# Patient Record
Sex: MALE | Race: WHITE | NOT HISPANIC OR LATINO | Employment: OTHER | URBAN - NONMETROPOLITAN AREA
[De-identification: names, ages, dates, MRNs, and addresses within clinical notes are randomized per-mention and may not be internally consistent; named-entity substitution may affect disease eponyms.]

---

## 2017-01-24 DIAGNOSIS — N40.1 BPH (BENIGN PROSTATIC HYPERTROPHY) WITH URINARY OBSTRUCTION: ICD-10-CM

## 2017-01-24 DIAGNOSIS — N13.8 BPH (BENIGN PROSTATIC HYPERTROPHY) WITH URINARY OBSTRUCTION: ICD-10-CM

## 2017-01-24 RX ORDER — TAMSULOSIN HYDROCHLORIDE 0.4 MG/1
CAPSULE ORAL
Qty: 30 CAPSULE | Refills: 3 | Status: SHIPPED | OUTPATIENT
Start: 2017-01-24 | End: 2017-01-26 | Stop reason: SDUPTHER

## 2017-01-26 DIAGNOSIS — N40.1 BPH (BENIGN PROSTATIC HYPERTROPHY) WITH URINARY OBSTRUCTION: ICD-10-CM

## 2017-01-26 DIAGNOSIS — N13.8 BPH (BENIGN PROSTATIC HYPERTROPHY) WITH URINARY OBSTRUCTION: ICD-10-CM

## 2017-01-26 RX ORDER — CEPHALEXIN 250 MG/1
250 CAPSULE ORAL DAILY
Qty: 30 CAPSULE | Refills: 11 | Status: SHIPPED | OUTPATIENT
Start: 2017-01-26 | End: 2017-05-30 | Stop reason: HOSPADM

## 2017-01-26 RX ORDER — TAMSULOSIN HYDROCHLORIDE 0.4 MG/1
1 CAPSULE ORAL DAILY
Qty: 30 CAPSULE | Refills: 11 | Status: SHIPPED | OUTPATIENT
Start: 2017-01-26 | End: 2017-05-30 | Stop reason: SDUPTHER

## 2017-05-30 ENCOUNTER — OFFICE VISIT (OUTPATIENT)
Dept: UROLOGY | Facility: CLINIC | Age: 82
End: 2017-05-30

## 2017-05-30 VITALS
HEART RATE: 79 BPM | DIASTOLIC BLOOD PRESSURE: 75 MMHG | OXYGEN SATURATION: 96 % | TEMPERATURE: 98.7 F | SYSTOLIC BLOOD PRESSURE: 128 MMHG | RESPIRATION RATE: 18 BRPM

## 2017-05-30 DIAGNOSIS — N40.0 BENIGN NON-NODULAR PROSTATIC HYPERPLASIA, PRESENCE OF LOWER URINARY TRACT SYMPTOMS UNSPECIFIED: ICD-10-CM

## 2017-05-30 DIAGNOSIS — N40.1 BPH (BENIGN PROSTATIC HYPERTROPHY) WITH URINARY OBSTRUCTION: ICD-10-CM

## 2017-05-30 DIAGNOSIS — N52.01 ERECTILE DYSFUNCTION DUE TO ARTERIAL INSUFFICIENCY: ICD-10-CM

## 2017-05-30 DIAGNOSIS — Z87.440 HISTORY OF UTI: ICD-10-CM

## 2017-05-30 DIAGNOSIS — Z87.898 HISTORY OF URINARY RETENTION: Primary | ICD-10-CM

## 2017-05-30 DIAGNOSIS — N13.8 BPH (BENIGN PROSTATIC HYPERTROPHY) WITH URINARY OBSTRUCTION: ICD-10-CM

## 2017-05-30 LAB
BILIRUB BLD-MCNC: NEGATIVE MG/DL
CLARITY, POC: CLEAR
COLOR UR: YELLOW
GLUCOSE UR STRIP-MCNC: NEGATIVE MG/DL
KETONES UR QL: NEGATIVE
LEUKOCYTE EST, POC: NEGATIVE
NITRITE UR-MCNC: NEGATIVE MG/ML
PH UR: 6 [PH] (ref 5–8)
PROT UR STRIP-MCNC: ABNORMAL MG/DL
RBC # UR STRIP: NEGATIVE /UL
SP GR UR: 1.02 (ref 1–1.03)
UROBILINOGEN UR QL: NORMAL

## 2017-05-30 PROCEDURE — 81003 URINALYSIS AUTO W/O SCOPE: CPT | Performed by: UROLOGY

## 2017-05-30 PROCEDURE — 99213 OFFICE O/P EST LOW 20 MIN: CPT | Performed by: UROLOGY

## 2017-05-30 RX ORDER — SILDENAFIL CITRATE 20 MG/1
20 TABLET ORAL SEE ADMIN INSTRUCTIONS
Qty: 10 TABLET | Refills: 11 | Status: SHIPPED | OUTPATIENT
Start: 2017-05-30 | End: 2018-01-04

## 2017-05-30 RX ORDER — TAMSULOSIN HYDROCHLORIDE 0.4 MG/1
1 CAPSULE ORAL DAILY
Qty: 90 CAPSULE | Refills: 3 | Status: SHIPPED | OUTPATIENT
Start: 2017-05-30 | End: 2018-01-04 | Stop reason: SDUPTHER

## 2017-05-31 LAB
PSA FREE MFR SERPL: 35.7 %
PSA FREE SERPL-MCNC: 0.5 NG/ML
PSA SERPL-MCNC: 1.4 NG/ML (ref 0–4)

## 2017-06-29 ENCOUNTER — OFFICE VISIT (OUTPATIENT)
Dept: UROLOGY | Facility: CLINIC | Age: 82
End: 2017-06-29

## 2017-06-29 VITALS
WEIGHT: 177.4 LBS | HEART RATE: 97 BPM | HEIGHT: 72 IN | TEMPERATURE: 99.1 F | OXYGEN SATURATION: 99 % | DIASTOLIC BLOOD PRESSURE: 78 MMHG | SYSTOLIC BLOOD PRESSURE: 145 MMHG | BODY MASS INDEX: 24.03 KG/M2

## 2017-06-29 DIAGNOSIS — N13.8 BPH (BENIGN PROSTATIC HYPERTROPHY) WITH URINARY OBSTRUCTION: ICD-10-CM

## 2017-06-29 DIAGNOSIS — Z87.898 HISTORY OF URINARY RETENTION: Primary | ICD-10-CM

## 2017-06-29 DIAGNOSIS — N40.1 BPH (BENIGN PROSTATIC HYPERTROPHY) WITH URINARY OBSTRUCTION: ICD-10-CM

## 2017-06-29 LAB
BILIRUB BLD-MCNC: NEGATIVE MG/DL
CLARITY, POC: CLEAR
COLOR UR: YELLOW
GLUCOSE UR STRIP-MCNC: NEGATIVE MG/DL
KETONES UR QL: NEGATIVE
LEUKOCYTE EST, POC: NEGATIVE
NITRITE UR-MCNC: NEGATIVE MG/ML
PH UR: 6 [PH] (ref 5–8)
PROT UR STRIP-MCNC: NEGATIVE MG/DL
RBC # UR STRIP: NEGATIVE /UL
SP GR UR: 1.02 (ref 1–1.03)
UROBILINOGEN UR QL: NORMAL

## 2017-06-29 PROCEDURE — 81003 URINALYSIS AUTO W/O SCOPE: CPT | Performed by: UROLOGY

## 2017-06-29 PROCEDURE — 99213 OFFICE O/P EST LOW 20 MIN: CPT | Performed by: UROLOGY

## 2017-06-29 RX ORDER — CEPHALEXIN 250 MG/1
CAPSULE ORAL
COMMUNITY
Start: 2017-06-26 | End: 2020-05-20

## 2017-06-29 NOTE — PROGRESS NOTES
Chief Complaint  Follow-up (1 month fup)        DESHAUN Baugh is a 81 y.o. male who returns today for follow-up with a past history of urinary retention that produced hydronephrosis and renal failure.  After TURP he's been voiding better and the creatinine has been stable.  He had some trouble with infections last year and was maintained on chronic suppression with Keflex.  He is currently having no difficulty voiding at least while taking Flomax.    Vitals:    06/29/17 1320   BP: 145/78   Pulse: 97   Temp: 99.1 °F (37.3 °C)   SpO2: 99%       Past Medical History  Past Medical History:   Diagnosis Date   • Elevated PSA    • Hydronephrosis    • Kidney failure    • Urinary retention        Past Surgical History  Past Surgical History:   Procedure Laterality Date   • CYSTOSCOPY TRANSURETHRAL RESECTION OF PROSTATE         Medications  has a current medication list which includes the following prescription(s): amlodipine, cephalexin, cetirizine, famotidine, fluticasone, levothyroxine, pravastatin, sildenafil, sildenafil, sodium bicarbonate, tamsulosin, and telmisartan.      Allergies  No Known Allergies    Social History  Social History     Social History Narrative       Family History  He has no family history of bladder or kidney cancer  He has no family history of kidney stones      AUA Symptom Score:      Review of Systems  Review of Systems    Physical Exam  Physical Exam   Constitutional: He is oriented to person, place, and time. He appears well-developed and well-nourished.   HENT:   Head: Normocephalic and atraumatic.   Neck: Normal range of motion.   Pulmonary/Chest: Effort normal. No respiratory distress.   Abdominal: Soft. He exhibits no distension and no mass. There is no tenderness. No hernia.   Genitourinary: Rectum normal and prostate normal.   Musculoskeletal: Normal range of motion.   Lymphadenopathy:     He has no cervical adenopathy.   Neurological: He is alert and oriented to person, place, and time.    Skin: Skin is warm and dry.   Psychiatric: He has a normal mood and affect. His behavior is normal.   Vitals reviewed.      Labs Recent and today in the office:  Results for orders placed or performed in visit on 06/29/17   POC Urinalysis Dipstick, Automated   Result Value Ref Range    Color Yellow Yellow, Straw, Dark Yellow, Muriel    Clarity, UA Clear Clear    Glucose, UA Negative Negative, 1000 mg/dL (3+) mg/dL    Bilirubin Negative Negative    Ketones, UA Negative Negative    Specific Gravity  1.020 1.005 - 1.030    Blood, UA Negative Negative    pH, Urine 6.0 5.0 - 8.0    Protein, POC Negative Negative mg/dL    Urobilinogen, UA Normal Normal    Leukocytes Negative Negative    Nitrite, UA Negative Negative         Assessment & Plan  Digital rectal exam is benign and total to free PSA ratio indicating little risk of cancer.  He'll therefore continue the Flomax and return in 6 months or when necessary.  He was to stop taking the daily Keflex.

## 2018-01-04 ENCOUNTER — OFFICE VISIT (OUTPATIENT)
Dept: UROLOGY | Facility: CLINIC | Age: 83
End: 2018-01-04

## 2018-01-04 VITALS
OXYGEN SATURATION: 98 % | HEART RATE: 85 BPM | SYSTOLIC BLOOD PRESSURE: 128 MMHG | RESPIRATION RATE: 16 BRPM | TEMPERATURE: 98.3 F | BODY MASS INDEX: 24.41 KG/M2 | WEIGHT: 180 LBS | DIASTOLIC BLOOD PRESSURE: 76 MMHG

## 2018-01-04 DIAGNOSIS — Z87.898 HISTORY OF URINARY RETENTION: Primary | ICD-10-CM

## 2018-01-04 DIAGNOSIS — N40.1 BPH WITH URINARY OBSTRUCTION: ICD-10-CM

## 2018-01-04 DIAGNOSIS — N13.8 BPH WITH URINARY OBSTRUCTION: ICD-10-CM

## 2018-01-04 DIAGNOSIS — N19 RENAL FAILURE, UNSPECIFIED CHRONICITY: ICD-10-CM

## 2018-01-04 PROCEDURE — 81003 URINALYSIS AUTO W/O SCOPE: CPT | Performed by: UROLOGY

## 2018-01-04 PROCEDURE — 99214 OFFICE O/P EST MOD 30 MIN: CPT | Performed by: UROLOGY

## 2018-01-04 RX ORDER — TAMSULOSIN HYDROCHLORIDE 0.4 MG/1
1 CAPSULE ORAL DAILY
Qty: 90 CAPSULE | Refills: 3 | Status: SHIPPED | OUTPATIENT
Start: 2018-01-04 | End: 2018-07-11 | Stop reason: SDUPTHER

## 2018-01-04 RX ORDER — FLUTICASONE PROPIONATE 50 MCG
2 SPRAY, SUSPENSION (ML) NASAL DAILY
Qty: 16 G | Refills: 5 | OUTPATIENT
Start: 2018-01-04 | End: 2020-05-20

## 2018-01-04 NOTE — PROGRESS NOTES
Chief Complaint  Urinary Retention (6 month fup.) and history of renal failure        DESHAUN Baugh is a 82 y.o. male who returns for follow-up of his known enlarged prostate and difficulty voiding and even urinary retention that prompted hydronephrosis and renal failure.  Fortunately after TURP he's been voiding much better at least while taking Flomax.  He's had problems with recurrent infections in the past but none recently and he is no longer taking his chronic suppression with Keflex.  He is still followed by Dr. Benjamin for his chronic renal insufficiency.  His digital rectal exam and PSA were normal on last visit indicating little risk of prostate cancer.  He is also asking for refill on his nasal steroid which I support since if he takes decongestants for his allergies it interferes with his voiding.    Vitals:    01/04/18 1312   BP: 128/76   Pulse: 85   Resp: 16   Temp: 98.3 °F (36.8 °C)   SpO2: 98%       Past Medical History  Past Medical History:   Diagnosis Date   • Elevated PSA    • Hydronephrosis    • Kidney failure    • Urinary retention        Past Surgical History  Past Surgical History:   Procedure Laterality Date   • CYSTOSCOPY TRANSURETHRAL RESECTION OF PROSTATE         Medications  has a current medication list which includes the following prescription(s): amlodipine, famotidine, fluticasone, levothyroxine, pravastatin, sildenafil, sildenafil, sodium bicarbonate, tamsulosin, telmisartan, cephalexin, and cetirizine.      Allergies  No Known Allergies    Social History  Social History     Social History Narrative       Family History  He has no family history of bladder or kidney cancer  He has no family history of kidney stones      AUA Symptom Score:      Review of Systems  Review of Systems   Constitutional: Negative.    Genitourinary: Negative.    All other systems reviewed and are negative.      Physical Exam  Physical Exam    Labs Recent and today in the office:  Results for orders placed or  performed in visit on 01/04/18   POC Urinalysis Dipstick, Automated   Result Value Ref Range    Color Yellow Yellow, Straw, Dark Yellow, Muriel    Clarity, UA Clear Clear    Glucose, UA Negative Negative, 1000 mg/dL (3+) mg/dL    Bilirubin Negative Negative    Ketones, UA Negative Negative    Specific Gravity  1.020 1.005 - 1.030    Blood, UA Negative Negative    pH, Urine 6.0 5.0 - 8.0    Protein, POC Trace (A) Negative mg/dL    Urobilinogen, UA Normal Normal    Leukocytes Negative Negative    Nitrite, UA Negative Negative         Assessment & Plan  BPH: He'll continue his daily Flomax and return in 6 months for BLANCA and PSA.  His medication is refilled today.  His voided urine is clear.

## 2018-07-11 ENCOUNTER — OFFICE VISIT (OUTPATIENT)
Dept: UROLOGY | Facility: CLINIC | Age: 83
End: 2018-07-11

## 2018-07-11 VITALS
DIASTOLIC BLOOD PRESSURE: 70 MMHG | BODY MASS INDEX: 24.41 KG/M2 | TEMPERATURE: 98.6 F | HEART RATE: 82 BPM | WEIGHT: 180 LBS | OXYGEN SATURATION: 98 % | SYSTOLIC BLOOD PRESSURE: 128 MMHG

## 2018-07-11 DIAGNOSIS — N28.9 KIDNEY DISEASE: Primary | ICD-10-CM

## 2018-07-11 DIAGNOSIS — N40.1 BPH WITH URINARY OBSTRUCTION: ICD-10-CM

## 2018-07-11 DIAGNOSIS — N13.8 BPH WITH URINARY OBSTRUCTION: ICD-10-CM

## 2018-07-11 DIAGNOSIS — Z87.898 HISTORY OF URINARY RETENTION: ICD-10-CM

## 2018-07-11 DIAGNOSIS — N52.01 ERECTILE DYSFUNCTION DUE TO ARTERIAL INSUFFICIENCY: ICD-10-CM

## 2018-07-11 LAB
BILIRUB BLD-MCNC: NEGATIVE MG/DL
CLARITY, POC: CLEAR
COLOR UR: YELLOW
GLUCOSE UR STRIP-MCNC: NEGATIVE MG/DL
KETONES UR QL: NEGATIVE
LEUKOCYTE EST, POC: NEGATIVE
NITRITE UR-MCNC: NEGATIVE MG/ML
PH UR: 6 [PH] (ref 5–8)
PROT UR STRIP-MCNC: NEGATIVE MG/DL
PSA SERPL-MCNC: 1.24 NG/ML (ref 0.06–4)
RBC # UR STRIP: NEGATIVE /UL
SP GR UR: 1.02 (ref 1–1.03)
UROBILINOGEN UR QL: NORMAL

## 2018-07-11 PROCEDURE — 99213 OFFICE O/P EST LOW 20 MIN: CPT | Performed by: UROLOGY

## 2018-07-11 PROCEDURE — 81003 URINALYSIS AUTO W/O SCOPE: CPT | Performed by: UROLOGY

## 2018-07-11 RX ORDER — TAMSULOSIN HYDROCHLORIDE 0.4 MG/1
1 CAPSULE ORAL DAILY
Qty: 90 CAPSULE | Refills: 3 | Status: SHIPPED | OUTPATIENT
Start: 2018-07-11 | End: 2019-07-16 | Stop reason: SDUPTHER

## 2018-07-11 RX ORDER — SILDENAFIL CITRATE 20 MG/1
60 TABLET ORAL DAILY PRN
Qty: 30 TABLET | Refills: 11 | Status: SHIPPED | OUTPATIENT
Start: 2018-07-11 | End: 2019-07-11

## 2018-07-11 NOTE — PROGRESS NOTES
Chief Complaint  Benign Prostatic Hypertrophy (6 month fup.)        DESHAUN Baugh is a 82 y.o. male who returns for routine follow-up with minimal difficulty voiding on Flomax 20 years after TURP.  He had been in urinary retention with hydronephrosis A K I and required dialysis for 9 months.    Vitals:    07/11/18 1309   BP: 128/70   Pulse: 82   Temp: 98.6 °F (37 °C)   SpO2: 98%       Past Medical History  Past Medical History:   Diagnosis Date   • Elevated PSA    • Hydronephrosis    • Kidney failure    • Urinary retention        Past Surgical History  Past Surgical History:   Procedure Laterality Date   • CYSTOSCOPY TRANSURETHRAL RESECTION OF PROSTATE         Medications  has a current medication list which includes the following prescription(s): amlodipine, cephalexin, cetirizine, famotidine, fluticasone, levothyroxine, pravastatin, sodium bicarbonate, tamsulosin, and telmisartan.      Allergies  No Known Allergies    Social History  Social History     Social History Narrative   • No narrative on file       Family History  He has no family history of bladder or kidney cancer  He has no family history of kidney stones      AUA Symptom Score:      Review of Systems  Review of Systems    Physical Exam  Physical Exam   Constitutional: He is oriented to person, place, and time. He appears well-developed and well-nourished.   HENT:   Head: Normocephalic and atraumatic.   Neck: Normal range of motion.   Pulmonary/Chest: Effort normal. No respiratory distress.   Abdominal: Soft. He exhibits no distension and no mass. There is no tenderness. No hernia.   Genitourinary: Rectum normal and prostate normal.   Musculoskeletal: Normal range of motion.   Lymphadenopathy:     He has no cervical adenopathy.   Neurological: He is alert and oriented to person, place, and time.   Skin: Skin is warm and dry.   Psychiatric: He has a normal mood and affect. His behavior is normal.   Vitals reviewed.      Labs Recent and today in the  office:  Results for orders placed or performed in visit on 07/11/18   POC Urinalysis Dipstick, Automated   Result Value Ref Range    Color Yellow Yellow, Straw, Dark Yellow, Muriel    Clarity, UA Clear Clear    Specific Gravity  1.020 1.005 - 1.030    pH, Urine 6.0 5.0 - 8.0    Leukocytes Negative Negative    Nitrite, UA Negative Negative    Protein, POC Negative Negative mg/dL    Glucose, UA Negative Negative, 1000 mg/dL (3+) mg/dL    Ketones, UA Negative Negative    Urobilinogen, UA Normal Normal    Bilirubin Negative Negative    Blood, UA Negative Negative         Assessment & Plan  #1 BPH with outlet obstruction: He is voiding fine on Flomax so this will be renewed.  Digital rectal exam reveals induration consistent with postsurgical change but no suspicious nodularity.  We'll get a PSA and if normal he can return in 6 months.    #2 erectile dysfunction: He is asking for some generic Viagra and this is provided.

## 2018-10-09 ENCOUNTER — TRANSCRIBE ORDERS (OUTPATIENT)
Dept: ADMINISTRATIVE | Facility: HOSPITAL | Age: 83
End: 2018-10-09

## 2018-10-09 ENCOUNTER — HOSPITAL ENCOUNTER (EMERGENCY)
Facility: HOSPITAL | Age: 83
Discharge: HOME | End: 2018-10-09
Attending: EMERGENCY MEDICINE | Admitting: EMERGENCY MEDICINE

## 2018-10-09 ENCOUNTER — APPOINTMENT (OUTPATIENT)
Dept: LAB | Facility: HOSPITAL | Age: 83
End: 2018-10-09

## 2018-10-09 VITALS
RESPIRATION RATE: 18 BRPM | SYSTOLIC BLOOD PRESSURE: 184 MMHG | HEART RATE: 92 BPM | TEMPERATURE: 97.9 F | BODY MASS INDEX: 23.73 KG/M2 | OXYGEN SATURATION: 96 % | WEIGHT: 175.2 LBS | DIASTOLIC BLOOD PRESSURE: 84 MMHG | HEIGHT: 72 IN

## 2018-10-09 DIAGNOSIS — N18.4 STAGE 4 CHRONIC KIDNEY DISEASE (HCC): ICD-10-CM

## 2018-10-09 DIAGNOSIS — E87.5 HYPERKALEMIA: Primary | ICD-10-CM

## 2018-10-09 DIAGNOSIS — N18.4 CHRONIC KIDNEY DISEASE, STAGE IV (SEVERE) (HCC): Primary | ICD-10-CM

## 2018-10-09 LAB
ALBUMIN SERPL-MCNC: 4.5 G/DL (ref 3.5–5)
ANION GAP SERPL CALCULATED.3IONS-SCNC: 10.2 MMOL/L (ref 10–20)
ANION GAP SERPL CALCULATED.3IONS-SCNC: 14.4 MMOL/L (ref 10–20)
BASOPHILS # BLD AUTO: 0.03 10*3/MM3 (ref 0–0.2)
BASOPHILS NFR BLD AUTO: 0.4 % (ref 0–2.5)
BUN BLD-MCNC: 51 MG/DL (ref 7–20)
BUN BLD-MCNC: 55 MG/DL (ref 7–20)
BUN/CREAT SERPL: 17.6 (ref 6.3–21.9)
BUN/CREAT SERPL: 17.7 (ref 6.3–21.9)
CALCIUM SPEC-SCNC: 10.3 MG/DL (ref 8.4–10.2)
CALCIUM SPEC-SCNC: 9.4 MG/DL (ref 8.4–10.2)
CHLORIDE SERPL-SCNC: 109 MMOL/L (ref 98–107)
CHLORIDE SERPL-SCNC: 112 MMOL/L (ref 98–107)
CO2 SERPL-SCNC: 22 MMOL/L (ref 26–30)
CO2 SERPL-SCNC: 24 MMOL/L (ref 26–30)
CREAT BLD-MCNC: 2.9 MG/DL (ref 0.6–1.3)
CREAT BLD-MCNC: 3.1 MG/DL (ref 0.6–1.3)
DEPRECATED RDW RBC AUTO: 45 FL (ref 37–54)
EOSINOPHIL # BLD AUTO: 0.22 10*3/MM3 (ref 0–0.7)
EOSINOPHIL NFR BLD AUTO: 2.7 % (ref 0–7)
ERYTHROCYTE [DISTWIDTH] IN BLOOD BY AUTOMATED COUNT: 13.2 % (ref 11.5–14.5)
GFR SERPL CREATININE-BSD FRML MDRD: 19 ML/MIN/1.73
GFR SERPL CREATININE-BSD FRML MDRD: 21 ML/MIN/1.73
GLUCOSE BLD-MCNC: 101 MG/DL (ref 74–98)
GLUCOSE BLD-MCNC: 98 MG/DL (ref 74–98)
HCT VFR BLD AUTO: 39.4 % (ref 42–52)
HGB BLD-MCNC: 12.8 G/DL (ref 14–18)
IMM GRANULOCYTES # BLD: 0.04 10*3/MM3 (ref 0–0.06)
IMM GRANULOCYTES NFR BLD: 0.5 % (ref 0–0.6)
LYMPHOCYTES # BLD AUTO: 1.23 10*3/MM3 (ref 0.6–3.4)
LYMPHOCYTES NFR BLD AUTO: 15.1 % (ref 10–50)
MCH RBC QN AUTO: 30.3 PG (ref 27–31)
MCHC RBC AUTO-ENTMCNC: 32.5 G/DL (ref 30–37)
MCV RBC AUTO: 93.1 FL (ref 80–94)
MONOCYTES # BLD AUTO: 0.7 10*3/MM3 (ref 0–0.9)
MONOCYTES NFR BLD AUTO: 8.6 % (ref 0–12)
NEUTROPHILS # BLD AUTO: 5.92 10*3/MM3 (ref 2–6.9)
NEUTROPHILS NFR BLD AUTO: 72.7 % (ref 37–80)
NRBC BLD MANUAL-RTO: 0 /100 WBC (ref 0–0)
PHOSPHATE SERPL-MCNC: 4.1 MG/DL (ref 2.5–4.5)
PLATELET # BLD AUTO: 184 10*3/MM3 (ref 130–400)
PMV BLD AUTO: 9.9 FL (ref 6–12)
POTASSIUM BLD-SCNC: 5.2 MMOL/L (ref 3.5–5.1)
POTASSIUM BLD-SCNC: 6.4 MMOL/L (ref 3.5–5.1)
RBC # BLD AUTO: 4.23 10*6/MM3 (ref 4.7–6.1)
SODIUM BLD-SCNC: 139 MMOL/L (ref 137–145)
SODIUM BLD-SCNC: 141 MMOL/L (ref 137–145)
WBC NRBC COR # BLD: 8.14 10*3/MM3 (ref 4.8–10.8)

## 2018-10-09 PROCEDURE — 96361 HYDRATE IV INFUSION ADD-ON: CPT

## 2018-10-09 PROCEDURE — 96375 TX/PRO/DX INJ NEW DRUG ADDON: CPT

## 2018-10-09 PROCEDURE — 36415 COLL VENOUS BLD VENIPUNCTURE: CPT | Performed by: PHYSICIAN ASSISTANT

## 2018-10-09 PROCEDURE — 85025 COMPLETE CBC W/AUTO DIFF WBC: CPT | Performed by: EMERGENCY MEDICINE

## 2018-10-09 PROCEDURE — 63710000001 INSULIN REGULAR HUMAN PER 5 UNITS: Performed by: EMERGENCY MEDICINE

## 2018-10-09 PROCEDURE — 93005 ELECTROCARDIOGRAM TRACING: CPT | Performed by: EMERGENCY MEDICINE

## 2018-10-09 PROCEDURE — 25010000002 FUROSEMIDE PER 20 MG: Performed by: EMERGENCY MEDICINE

## 2018-10-09 PROCEDURE — 96374 THER/PROPH/DIAG INJ IV PUSH: CPT

## 2018-10-09 PROCEDURE — 80048 BASIC METABOLIC PNL TOTAL CA: CPT | Performed by: EMERGENCY MEDICINE

## 2018-10-09 PROCEDURE — 99283 EMERGENCY DEPT VISIT LOW MDM: CPT

## 2018-10-09 PROCEDURE — 80069 RENAL FUNCTION PANEL: CPT | Performed by: PHYSICIAN ASSISTANT

## 2018-10-09 RX ORDER — FUROSEMIDE 10 MG/ML
20 INJECTION INTRAMUSCULAR; INTRAVENOUS ONCE
Status: COMPLETED | OUTPATIENT
Start: 2018-10-09 | End: 2018-10-09

## 2018-10-09 RX ORDER — DEXTROSE MONOHYDRATE 25 G/50ML
50 INJECTION, SOLUTION INTRAVENOUS
Status: DISCONTINUED | OUTPATIENT
Start: 2018-10-09 | End: 2018-10-09 | Stop reason: HOSPADM

## 2018-10-09 RX ORDER — SODIUM CHLORIDE 0.9 % (FLUSH) 0.9 %
10 SYRINGE (ML) INJECTION AS NEEDED
Status: DISCONTINUED | OUTPATIENT
Start: 2018-10-09 | End: 2018-10-09 | Stop reason: HOSPADM

## 2018-10-09 RX ORDER — SODIUM POLYSTYRENE SULFONATE 15 G/60ML
15 SUSPENSION ORAL; RECTAL ONCE
Status: COMPLETED | OUTPATIENT
Start: 2018-10-09 | End: 2018-10-09

## 2018-10-09 RX ORDER — SODIUM POLYSTYRENE SULFONATE 4.1 MEQ/G
POWDER, FOR SUSPENSION ORAL; RECTAL ONCE
Qty: 15 G | Refills: 0 | Status: SHIPPED | OUTPATIENT
Start: 2018-10-09 | End: 2018-10-09

## 2018-10-09 RX ADMIN — SODIUM POLYSTYRENE SULFONATE 15 G: 15 SUSPENSION ORAL; RECTAL at 18:43

## 2018-10-09 RX ADMIN — HUMAN INSULIN 3 UNITS: 100 INJECTION, SOLUTION SUBCUTANEOUS at 17:03

## 2018-10-09 RX ADMIN — SODIUM CHLORIDE 1000 ML: 9 INJECTION, SOLUTION INTRAVENOUS at 17:01

## 2018-10-09 RX ADMIN — SODIUM BICARBONATE 50 MEQ: 84 INJECTION, SOLUTION INTRAVENOUS at 17:06

## 2018-10-09 RX ADMIN — FUROSEMIDE 20 MG: 10 INJECTION, SOLUTION INTRAMUSCULAR; INTRAVENOUS at 17:02

## 2018-10-09 RX ADMIN — DEXTROSE MONOHYDRATE 50 ML: 25 INJECTION, SOLUTION INTRAVENOUS at 17:04

## 2018-10-09 NOTE — ED PROVIDER NOTES
Subjective   History of Present Illness   83-year-old male with a history of chronic kidney disease previously requiring dialysis 18 years ago now sent in after outpatient routine labs showed he had an elevated potassium.  His creatinine usually runs in the 3-3.4 range, and today it was 3.1 but his potassium was 6.4.  He denies any changes in medications, only takes Flomax, changes in diet, changes in urine output, or other symptoms.  States he feels well now.  His lab work was done at 3:05 PM.    Review of Systems   All other systems reviewed and are negative.      Past Medical History:   Diagnosis Date   • Elevated PSA    • Hydronephrosis    • Kidney failure    • Urinary retention        No Known Allergies    Past Surgical History:   Procedure Laterality Date   • CYSTOSCOPY TRANSURETHRAL RESECTION OF PROSTATE         History reviewed. No pertinent family history.    Social History     Social History   • Marital status:      Occupational History   •  Retired     Social History Main Topics   • Smoking status: Never Smoker   • Smokeless tobacco: Never Used   • Alcohol use No   • Drug use: No   • Sexual activity: Defer     Other Topics Concern   • Not on file           Objective   Physical Exam   Constitutional: He is oriented to person, place, and time. He appears well-developed and well-nourished. No distress.   HENT:   Head: Normocephalic.   Mouth/Throat: Oropharynx is clear and moist.   Eyes: No scleral icterus.   Neck: Neck supple. No tracheal deviation present.   Cardiovascular: Normal rate, regular rhythm, normal heart sounds and intact distal pulses.  Exam reveals no gallop and no friction rub.    No murmur heard.  Pulmonary/Chest: Effort normal and breath sounds normal. No stridor. No respiratory distress. He has no wheezes. He has no rales. He exhibits no tenderness.   Abdominal: Soft. He exhibits no distension and no mass. There is no tenderness. There is no rebound and no guarding.    Musculoskeletal: Normal range of motion. He exhibits no deformity.   Neurological: He is alert and oriented to person, place, and time.   Skin: Skin is warm and dry. No rash noted. He is not diaphoretic. No erythema. No pallor.   Psychiatric: He has a normal mood and affect. His behavior is normal.   Nursing note and vitals reviewed.      Procedures           ED Course  ED Course as of Oct 09 1831   Tue Oct 09, 2018   1749 EKG: NSR w/ HR 96, no jayshree/std. Mildly peaked T waves. Narrow QRS. Non-specific EKG  [AI]      ED Course User Index  [AI] Phil Lyons MD                  [facility]  83-year-old male here for further evaluation and management of hyperkalemia in the setting of chronic kidney disease.  Patient is currently asymptomatic.  Unclear why he would have this new hyperkalemia.  Unclear if this labs previously hemolyzed.  However, given that his labs were run just about an hour and 40 minutes ago, will give fluids, Lasix, insulin and glucose, bicarbonate, kidney EKG, and then assess his EMS he again.  If this is much improved, may be able to discharge home with outpatient follow-up versus admission for further management.    6:31 PM repeat potassium is gone from 6.44-5.2.  Some of this may be due to the temporizing measures of the insulin and glucose and the bicarbonate.  However, will start the patient on Kayexalate and have him follow-up with his primary care tomorrow to have labs repeated.  He is not interested in staying in the hospital at this time.    Final diagnoses:   Hyperkalemia   Stage 4 chronic kidney disease (CMS/[facility])            Phil Lyons MD  10/09/18 0772

## 2018-10-16 ENCOUNTER — APPOINTMENT (OUTPATIENT)
Dept: LAB | Facility: HOSPITAL | Age: 83
End: 2018-10-16

## 2018-10-16 ENCOUNTER — TRANSCRIBE ORDERS (OUTPATIENT)
Dept: ADMINISTRATIVE | Facility: HOSPITAL | Age: 83
End: 2018-10-16

## 2018-10-16 DIAGNOSIS — N18.4 CHRONIC KIDNEY DISEASE, STAGE IV (SEVERE) (HCC): Primary | ICD-10-CM

## 2018-10-16 LAB
ALBUMIN SERPL-MCNC: 4.4 G/DL (ref 3.5–5)
ANION GAP SERPL CALCULATED.3IONS-SCNC: 12.3 MMOL/L (ref 10–20)
BUN BLD-MCNC: 37 MG/DL (ref 7–20)
BUN/CREAT SERPL: 14.2 (ref 6.3–21.9)
CALCIUM SPEC-SCNC: 9.4 MG/DL (ref 8.4–10.2)
CHLORIDE SERPL-SCNC: 111 MMOL/L (ref 98–107)
CO2 SERPL-SCNC: 22 MMOL/L (ref 26–30)
CREAT BLD-MCNC: 2.6 MG/DL (ref 0.6–1.3)
GFR SERPL CREATININE-BSD FRML MDRD: 24 ML/MIN/1.73
GLUCOSE BLD-MCNC: 99 MG/DL (ref 74–98)
PHOSPHATE SERPL-MCNC: 2.9 MG/DL (ref 2.5–4.5)
POTASSIUM BLD-SCNC: 4.3 MMOL/L (ref 3.5–5.1)
SODIUM BLD-SCNC: 141 MMOL/L (ref 137–145)

## 2018-10-16 PROCEDURE — 80069 RENAL FUNCTION PANEL: CPT | Performed by: PHYSICIAN ASSISTANT

## 2018-10-16 PROCEDURE — 36415 COLL VENOUS BLD VENIPUNCTURE: CPT | Performed by: PHYSICIAN ASSISTANT

## 2019-01-15 ENCOUNTER — OFFICE VISIT (OUTPATIENT)
Dept: UROLOGY | Facility: CLINIC | Age: 84
End: 2019-01-15

## 2019-01-15 VITALS
HEART RATE: 84 BPM | WEIGHT: 175 LBS | HEIGHT: 72 IN | BODY MASS INDEX: 23.7 KG/M2 | DIASTOLIC BLOOD PRESSURE: 80 MMHG | OXYGEN SATURATION: 98 % | SYSTOLIC BLOOD PRESSURE: 156 MMHG

## 2019-01-15 DIAGNOSIS — N13.8 BENIGN PROSTATIC HYPERPLASIA WITH URINARY OBSTRUCTION: Primary | ICD-10-CM

## 2019-01-15 DIAGNOSIS — N40.1 BENIGN PROSTATIC HYPERPLASIA WITH URINARY OBSTRUCTION: Primary | ICD-10-CM

## 2019-01-15 PROCEDURE — 99213 OFFICE O/P EST LOW 20 MIN: CPT | Performed by: UROLOGY

## 2019-01-15 PROCEDURE — 81003 URINALYSIS AUTO W/O SCOPE: CPT | Performed by: UROLOGY

## 2019-01-15 NOTE — PROGRESS NOTES
Chief Complaint  Benign Prostatic Hypertrophy (6 months) and Erectile Dysfunction        DESHAUN Baugh is a 83 y.o. male who returns today for his semiannual visit follow-up originally presenting in urinary retention and renal failure.  He's had no trouble voiding after TURP and has managed to stay off dialysis.  He recently had a bout of elevated potassium and generally runs a creatinine of around 2.5.  Urine today is clear and his digital rectal exam and PSA were negative for cancer on his last visit 6 months ago.    Vitals:    01/15/19 1316   BP: 156/80   Pulse: 84   SpO2: 98%       Past Medical History  Past Medical History:   Diagnosis Date   • Elevated PSA    • Hydronephrosis    • Kidney failure    • Urinary retention        Past Surgical History  Past Surgical History:   Procedure Laterality Date   • CYSTOSCOPY TRANSURETHRAL RESECTION OF PROSTATE         Medications  has a current medication list which includes the following prescription(s): amlodipine, cephalexin, cetirizine, famotidine, fluticasone, levothyroxine, pravastatin, sildenafil, sodium bicarbonate, tamsulosin, and telmisartan.      Allergies  No Known Allergies    Social History  Social History     Social History Narrative   • Not on file       Family History  He has no family history of bladder or kidney cancer  He has no family history of kidney stones      AUA Symptom Score:      Review of Systems  Review of Systems   Constitutional: Negative.    Genitourinary: Negative.    All other systems reviewed and are negative.      Physical Exam  Physical Exam    Labs Recent and today in the office:  Results for orders placed or performed in visit on 01/15/19   POC Urinalysis Dipstick, Automated   Result Value Ref Range    Color Yellow Yellow, Straw, Dark Yellow, Muriel    Clarity, UA Clear Clear    Specific Gravity  1.025 1.005 - 1.030    pH, Urine 6.0 5.0 - 8.0    Leukocytes Negative Negative    Nitrite, UA Negative Negative    Protein, POC Trace (A)  Negative mg/dL    Glucose, UA Negative Negative, 1000 mg/dL (3+) mg/dL    Ketones, UA Negative Negative    Urobilinogen, UA Normal Normal    Bilirubin Negative Negative    Blood, UA Negative Negative         Assessment & Plan  #1 BPH with outlet obstruction: Patient voiding much better after TURP.  Is currently taking Flomax and returns with clear urine.  In the BLANCA and PSA in 6 months.

## 2019-04-20 ENCOUNTER — TRANSCRIBE ORDERS (OUTPATIENT)
Dept: LAB | Facility: HOSPITAL | Age: 84
End: 2019-04-20

## 2019-04-20 ENCOUNTER — LAB (OUTPATIENT)
Dept: LAB | Facility: HOSPITAL | Age: 84
End: 2019-04-20

## 2019-04-20 DIAGNOSIS — N18.4 CHRONIC KIDNEY DISEASE, STAGE IV (SEVERE) (HCC): Primary | ICD-10-CM

## 2019-04-20 DIAGNOSIS — N18.4 CHRONIC KIDNEY DISEASE, STAGE IV (SEVERE) (HCC): ICD-10-CM

## 2019-04-20 LAB
ALBUMIN SERPL-MCNC: 4.2 G/DL (ref 3.5–5)
ANION GAP SERPL CALCULATED.3IONS-SCNC: 17.2 MMOL/L (ref 10–20)
BUN BLD-MCNC: 47 MG/DL (ref 7–20)
BUN/CREAT SERPL: 15.7 (ref 6.3–21.9)
CALCIUM SPEC-SCNC: 9.1 MG/DL (ref 8.4–10.2)
CHLORIDE SERPL-SCNC: 108 MMOL/L (ref 98–107)
CO2 SERPL-SCNC: 19 MMOL/L (ref 26–30)
CREAT BLD-MCNC: 3 MG/DL (ref 0.6–1.3)
GFR SERPL CREATININE-BSD FRML MDRD: 20 ML/MIN/1.73
GLUCOSE BLD-MCNC: 97 MG/DL (ref 74–98)
PHOSPHATE SERPL-MCNC: 4.3 MG/DL (ref 2.5–4.5)
POTASSIUM BLD-SCNC: 5.2 MMOL/L (ref 3.5–5.1)
SODIUM BLD-SCNC: 139 MMOL/L (ref 137–145)

## 2019-04-20 PROCEDURE — 36415 COLL VENOUS BLD VENIPUNCTURE: CPT

## 2019-04-20 PROCEDURE — 80069 RENAL FUNCTION PANEL: CPT

## 2019-07-16 ENCOUNTER — OFFICE VISIT (OUTPATIENT)
Dept: UROLOGY | Facility: CLINIC | Age: 84
End: 2019-07-16

## 2019-07-16 VITALS
OXYGEN SATURATION: 93 % | SYSTOLIC BLOOD PRESSURE: 122 MMHG | DIASTOLIC BLOOD PRESSURE: 65 MMHG | BODY MASS INDEX: 23.7 KG/M2 | HEIGHT: 72 IN | WEIGHT: 175 LBS

## 2019-07-16 DIAGNOSIS — N40.1 BPH WITH URINARY OBSTRUCTION: ICD-10-CM

## 2019-07-16 DIAGNOSIS — Z87.898 HISTORY OF URINARY RETENTION: Primary | ICD-10-CM

## 2019-07-16 DIAGNOSIS — N28.9 KIDNEY DISEASE: ICD-10-CM

## 2019-07-16 DIAGNOSIS — N52.01 ERECTILE DYSFUNCTION DUE TO ARTERIAL INSUFFICIENCY: ICD-10-CM

## 2019-07-16 DIAGNOSIS — N13.8 BPH WITH URINARY OBSTRUCTION: ICD-10-CM

## 2019-07-16 LAB
BILIRUB BLD-MCNC: NEGATIVE MG/DL
CLARITY, POC: CLEAR
COLOR UR: YELLOW
GLUCOSE UR STRIP-MCNC: NEGATIVE MG/DL
KETONES UR QL: NEGATIVE
LEUKOCYTE EST, POC: NEGATIVE
NITRITE UR-MCNC: NEGATIVE MG/ML
PH UR: 6 [PH] (ref 5–8)
PROT UR STRIP-MCNC: NEGATIVE MG/DL
PSA SERPL-MCNC: 2.87 NG/ML (ref 0.06–4)
RBC # UR STRIP: NEGATIVE /UL
SP GR UR: 1.02 (ref 1–1.03)
UROBILINOGEN UR QL: NORMAL

## 2019-07-16 PROCEDURE — 81003 URINALYSIS AUTO W/O SCOPE: CPT | Performed by: UROLOGY

## 2019-07-16 PROCEDURE — 99213 OFFICE O/P EST LOW 20 MIN: CPT | Performed by: UROLOGY

## 2019-07-16 RX ORDER — SILDENAFIL CITRATE 20 MG/1
TABLET ORAL
Qty: 30 TABLET | Refills: 11 | OUTPATIENT
Start: 2019-07-16 | End: 2020-05-20

## 2019-07-16 RX ORDER — CHOLECALCIFEROL (VITAMIN D3) 125 MCG
5000 CAPSULE ORAL DAILY
Refills: 11 | COMMUNITY
Start: 2019-05-05 | End: 2020-05-20

## 2019-07-16 RX ORDER — TAMSULOSIN HYDROCHLORIDE 0.4 MG/1
1 CAPSULE ORAL DAILY
Qty: 90 CAPSULE | Refills: 3 | Status: SHIPPED | OUTPATIENT
Start: 2019-07-16 | End: 2020-06-22 | Stop reason: SDUPTHER

## 2019-07-16 RX ORDER — MAGNESIUM OXIDE 400 MG/1
1 TABLET ORAL DAILY
Refills: 12 | COMMUNITY
Start: 2019-05-28 | End: 2020-05-20

## 2019-07-16 NOTE — PROGRESS NOTES
Chief Complaint  Benign Prostatic Hypertrophy (6 months follow up.); Kidney Disease; and History of urinary retention        DESHAUN Baugh is a 83 y.o. male who returns today for follow-up with a history of an enlarged prostate and bladder outlet obstruction that caused renal failure and almost the requirement for dialysis.  After TURP and now taking Flomax he is voiding without difficulty.  His creatinine fluctuates from 2.5-3 where it was recently measured.  He understands the need to avoid dehydration the summer months and takes only medicine for antihypertension cholesterol and erectile dysfunction.  He is requesting refills on sildenafil in addition to his Flomax.    Vitals:    07/16/19 1353   BP: 122/65   SpO2: 93%       Past Medical History  Past Medical History:   Diagnosis Date   • Elevated PSA    • Hydronephrosis    • Kidney failure    • Urinary retention        Past Surgical History  Past Surgical History:   Procedure Laterality Date   • CYSTOSCOPY TRANSURETHRAL RESECTION OF PROSTATE         Medications  has a current medication list which includes the following prescription(s): amlodipine, cetirizine, [facility] d3, famotidine, fluticasone, levothyroxine, magnesium oxide, pravastatin, sodium bicarbonate, tamsulosin, telmisartan, and cephalexin.      Allergies  No Known Allergies    Social History  Social History     Social History Narrative   • Not on file       Family History  He has no family history of bladder or kidney cancer  He has no family history of kidney stones      AUA Symptom Score:      Review of Systems  Review of Systems   Constitutional: Negative.    Genitourinary: Negative.    All other systems reviewed and are negative.      Physical Exam  Physical Exam   Constitutional: He is oriented to person, place, and time. He appears well-developed and well-nourished.   HENT:   Head: Normocephalic and atraumatic.   Neck: Normal range of motion.   Pulmonary/Chest: Effort normal. No respiratory distress.    Abdominal: Soft. He exhibits no distension and no mass. There is no tenderness. No hernia.   Genitourinary: Rectum normal and prostate normal.   Musculoskeletal: Normal range of motion.   Lymphadenopathy:     He has no cervical adenopathy.   Neurological: He is alert and oriented to person, place, and time.   Skin: Skin is warm and dry.   Psychiatric: He has a normal mood and affect. His behavior is normal.   Vitals reviewed.      Labs Recent and today in the office:  Results for orders placed or performed in visit on 07/16/19   POC Urinalysis Dipstick, Automated   Result Value Ref Range    Color Yellow Yellow, Straw, Dark Yellow, Muriel    Clarity, UA Clear Clear    Specific Gravity  1.020 1.005 - 1.030    pH, Urine 6.0 5.0 - 8.0    Leukocytes Negative Negative    Nitrite, UA Negative Negative    Protein, POC Negative Negative mg/dL    Glucose, UA Negative Negative, 1000 mg/dL (3+) mg/dL    Ketones, UA Negative Negative    Urobilinogen, UA Normal Normal    Bilirubin Negative Negative    Blood, UA Negative Negative         Assessment & Plan  BPH with outlet obstruction: Voiding adequately as long as he takes his Flomax which is renewed.  Digital rectal exam is benign and a PSA is submitted.  He is encouraged to eat a heart healthy diet.    Erectile dysfunction: He requests generic sildenafil and this is provided.

## 2020-01-30 ENCOUNTER — OFFICE VISIT (OUTPATIENT)
Dept: UROLOGY | Facility: CLINIC | Age: 85
End: 2020-01-30

## 2020-01-30 DIAGNOSIS — N40.0 BENIGN PROSTATIC HYPERPLASIA, UNSPECIFIED WHETHER LOWER URINARY TRACT SYMPTOMS PRESENT: Primary | ICD-10-CM

## 2020-01-30 DIAGNOSIS — Z87.898 HISTORY OF URINARY RETENTION: ICD-10-CM

## 2020-01-30 DIAGNOSIS — N52.01 ERECTILE DYSFUNCTION DUE TO ARTERIAL INSUFFICIENCY: ICD-10-CM

## 2020-01-30 PROCEDURE — 99213 OFFICE O/P EST LOW 20 MIN: CPT | Performed by: UROLOGY

## 2020-01-30 PROCEDURE — 81003 URINALYSIS AUTO W/O SCOPE: CPT | Performed by: UROLOGY

## 2020-01-30 NOTE — PROGRESS NOTES
Chief Complaint  Benign Prostatic Hypertrophy (6 month follow up.) and Urinary Retention        HPI  Amauri is a 84 y.o. male who turns today for follow-up with a past history of severe BPH urinary retention hydronephrosis and renal failure.  Since TURP he has been voiding much better and his creatinine stabilized around 2-1/2-3 so he is been able to come off of dialysis.  Currently his symptoms are minimal as long as he takes his Flomax.    Second concern the last visit was erectile dysfunction that he states is greatly helped with his generic sildenafil.    There were no vitals filed for this visit.    Past Medical History  Past Medical History:   Diagnosis Date   • Elevated PSA    • Hydronephrosis    • Kidney failure    • Urinary retention        Past Surgical History  Past Surgical History:   Procedure Laterality Date   • CYSTOSCOPY TRANSURETHRAL RESECTION OF PROSTATE         Medications  has a current medication list which includes the following prescription(s): amlodipine, cetirizine, [facility]vs d3, famotidine, fluticasone, levothyroxine, magnesium oxide, pravastatin, sildenafil, sodium bicarbonate, tamsulosin, telmisartan, and cephalexin.      Allergies  No Known Allergies    Social History  Social History     Socioeconomic History   • Marital status:      Spouse name: Not on file   • Number of children: Not on file   • Years of education: Not on file   • Highest education level: Not on file   Occupational History     Employer: RETIRED   Tobacco Use   • Smoking status: Never Smoker   • Smokeless tobacco: Never Used   Substance and Sexual Activity   • Alcohol use: No   • Drug use: No   • Sexual activity: Defer       Family History  He has no family history of bladder or kidney cancer  He has no family history of kidney stones      AUA Symptom Score:      Review of Systems  Review of Systems   Constitutional: Negative for activity change, appetite change, chills, fatigue, fever, unexpected weight gain and  unexpected weight loss.   Respiratory: Negative for apnea, cough, chest tightness, shortness of breath, wheezing and stridor.    Cardiovascular: Negative for chest pain, palpitations and leg swelling.   Gastrointestinal: Negative for abdominal distention, abdominal pain, anal bleeding, blood in stool, constipation, diarrhea, nausea, rectal pain, vomiting, GERD and indigestion.   Genitourinary: Negative for decreased libido, decreased urine volume, difficulty urinating, discharge, dysuria, flank pain, frequency, genital sores, hematuria, nocturia, penile pain, erectile dysfunction, penile swelling, scrotal swelling, testicular pain, urgency and urinary incontinence.   Musculoskeletal: Negative for back pain and joint swelling.   Neurological: Negative for tremors, seizures, speech difficulty, weakness and numbness.   Psychiatric/Behavioral: Negative for agitation, decreased concentration, sleep disturbance, depressed mood and stress. The patient is not nervous/anxious.        Physical Exam  Physical Exam   Constitutional: He is oriented to person, place, and time. He appears well-developed and well-nourished.   HENT:   Head: Normocephalic and atraumatic.   Neck: Normal range of motion.   Pulmonary/Chest: Effort normal. No respiratory distress.   Abdominal: He exhibits no distension and no mass. There is no tenderness. No hernia.   Musculoskeletal: Normal range of motion.   Lymphadenopathy:     He has no cervical adenopathy.   Neurological: He is alert and oriented to person, place, and time.   Skin: Skin is warm and dry.   Psychiatric: He has a normal mood and affect. His behavior is normal.   Vitals reviewed.      Labs Recent and today in the office:  Results for orders placed or performed in visit on 01/30/20   POC Urinalysis Dipstick, Automated   Result Value Ref Range    Color Yellow Yellow, Straw, Dark Yellow, Muriel    Clarity, UA Clear Clear    Specific Gravity  1.020 1.005 - 1.030    pH, Urine 6.0 5.0 - 8.0     Leukocytes Negative Negative    Nitrite, UA Negative Negative    Protein, POC Negative Negative mg/dL    Glucose, UA Negative Negative, 1000 mg/dL (3+) mg/dL    Ketones, UA Negative Negative    Urobilinogen, UA Normal Normal    Bilirubin Negative Negative    Blood, UA Negative Negative         Assessment & Plan  BPH with outlet obstruction: Currently patient is voiding well on Flomax after TURP years ago.  Digital rectal exam and PSA were benign on last visit.  He can return in 6 months for follow-up.    Erectile dysfunction: Improved on generic sildenafil.  He is encouraged to eat a plant-based heart healthy diet to reduce the risk of his atherosclerosis progressing.

## 2020-04-28 RX ORDER — CHOLECALCIFEROL (VITAMIN D3) 125 MCG
CAPSULE ORAL
Qty: 90 CAPSULE | Refills: 3 | OUTPATIENT
Start: 2020-04-28

## 2020-05-20 PROCEDURE — U0002 COVID-19 LAB TEST NON-CDC: HCPCS | Performed by: NURSE PRACTITIONER

## 2020-05-25 ENCOUNTER — HOSPITAL ENCOUNTER (EMERGENCY)
Facility: HOSPITAL | Age: 85
Discharge: HOME | End: 2020-05-25
Attending: EMERGENCY MEDICINE | Admitting: EMERGENCY MEDICINE

## 2020-05-25 ENCOUNTER — APPOINTMENT (OUTPATIENT)
Dept: CT IMAGING | Facility: HOSPITAL | Age: 85
End: 2020-05-25

## 2020-05-25 VITALS
WEIGHT: 163 LBS | HEIGHT: 72 IN | TEMPERATURE: 98.2 F | RESPIRATION RATE: 16 BRPM | BODY MASS INDEX: 22.08 KG/M2 | OXYGEN SATURATION: 100 % | SYSTOLIC BLOOD PRESSURE: 128 MMHG | HEART RATE: 65 BPM | DIASTOLIC BLOOD PRESSURE: 73 MMHG

## 2020-05-25 DIAGNOSIS — R13.10 DYSPHAGIA, UNSPECIFIED TYPE: Primary | ICD-10-CM

## 2020-05-25 LAB
ALBUMIN SERPL-MCNC: 4.4 G/DL (ref 3.5–5.2)
ALBUMIN/GLOB SERPL: 1.2 G/DL
ALP SERPL-CCNC: 80 U/L (ref 39–117)
ALT SERPL W P-5'-P-CCNC: 11 U/L (ref 1–41)
ANION GAP SERPL CALCULATED.3IONS-SCNC: 16 MMOL/L (ref 5–15)
AST SERPL-CCNC: 18 U/L (ref 1–40)
BASOPHILS # BLD AUTO: 0.02 10*3/MM3 (ref 0–0.2)
BASOPHILS NFR BLD AUTO: 0.2 % (ref 0–1.5)
BILIRUB SERPL-MCNC: 0.9 MG/DL (ref 0.2–1.2)
BUN BLD-MCNC: 61 MG/DL (ref 8–23)
BUN/CREAT SERPL: 19.1 (ref 7–25)
CALCIUM SPEC-SCNC: 11 MG/DL (ref 8.6–10.5)
CHLORIDE SERPL-SCNC: 103 MMOL/L (ref 98–107)
CO2 SERPL-SCNC: 19 MMOL/L (ref 22–29)
CREAT BLD-MCNC: 3.19 MG/DL (ref 0.76–1.27)
DEPRECATED RDW RBC AUTO: 40.9 FL (ref 37–54)
EOSINOPHIL # BLD AUTO: 0.07 10*3/MM3 (ref 0–0.4)
EOSINOPHIL NFR BLD AUTO: 0.9 % (ref 0.3–6.2)
ERYTHROCYTE [DISTWIDTH] IN BLOOD BY AUTOMATED COUNT: 12.6 % (ref 12.3–15.4)
GFR SERPL CREATININE-BSD FRML MDRD: 19 ML/MIN/1.73
GLOBULIN UR ELPH-MCNC: 3.7 GM/DL
GLUCOSE BLD-MCNC: 119 MG/DL (ref 65–99)
HCT VFR BLD AUTO: 46.3 % (ref 37.5–51)
HGB BLD-MCNC: 15.1 G/DL (ref 13–17.7)
IMM GRANULOCYTES # BLD AUTO: 0.03 10*3/MM3 (ref 0–0.05)
IMM GRANULOCYTES NFR BLD AUTO: 0.4 % (ref 0–0.5)
LIPASE SERPL-CCNC: 110 U/L (ref 13–60)
LYMPHOCYTES # BLD AUTO: 1.49 10*3/MM3 (ref 0.7–3.1)
LYMPHOCYTES NFR BLD AUTO: 18.5 % (ref 19.6–45.3)
MCH RBC QN AUTO: 29.2 PG (ref 26.6–33)
MCHC RBC AUTO-ENTMCNC: 32.6 G/DL (ref 31.5–35.7)
MCV RBC AUTO: 89.6 FL (ref 79–97)
MONOCYTES # BLD AUTO: 0.66 10*3/MM3 (ref 0.1–0.9)
MONOCYTES NFR BLD AUTO: 8.2 % (ref 5–12)
NEUTROPHILS # BLD AUTO: 5.77 10*3/MM3 (ref 1.7–7)
NEUTROPHILS NFR BLD AUTO: 71.8 % (ref 42.7–76)
NRBC BLD AUTO-RTO: 0 /100 WBC (ref 0–0.2)
PLATELET # BLD AUTO: 222 10*3/MM3 (ref 140–450)
PMV BLD AUTO: 10.2 FL (ref 6–12)
POTASSIUM BLD-SCNC: 5.3 MMOL/L (ref 3.5–5.2)
PROT SERPL-MCNC: 8.1 G/DL (ref 6–8.5)
RBC # BLD AUTO: 5.17 10*6/MM3 (ref 4.14–5.8)
SODIUM BLD-SCNC: 138 MMOL/L (ref 136–145)
TSH SERPL DL<=0.05 MIU/L-ACNC: 2.37 UIU/ML (ref 0.27–4.2)
WBC NRBC COR # BLD: 8.04 10*3/MM3 (ref 3.4–10.8)

## 2020-05-25 PROCEDURE — 84443 ASSAY THYROID STIM HORMONE: CPT | Performed by: PHYSICIAN ASSISTANT

## 2020-05-25 PROCEDURE — 85025 COMPLETE CBC W/AUTO DIFF WBC: CPT | Performed by: PHYSICIAN ASSISTANT

## 2020-05-25 PROCEDURE — 80053 COMPREHEN METABOLIC PANEL: CPT | Performed by: PHYSICIAN ASSISTANT

## 2020-05-25 PROCEDURE — 83690 ASSAY OF LIPASE: CPT | Performed by: PHYSICIAN ASSISTANT

## 2020-05-25 PROCEDURE — 70490 CT SOFT TISSUE NECK W/O DYE: CPT

## 2020-05-25 PROCEDURE — 99284 EMERGENCY DEPT VISIT MOD MDM: CPT

## 2020-05-25 RX ORDER — SODIUM CHLORIDE 0.9 % (FLUSH) 0.9 %
10 SYRINGE (ML) INJECTION AS NEEDED
Status: DISCONTINUED | OUTPATIENT
Start: 2020-05-25 | End: 2020-05-25 | Stop reason: HOSPADM

## 2020-05-25 RX ORDER — FAMOTIDINE 40 MG/1
40 TABLET, FILM COATED ORAL DAILY
Qty: 30 TABLET | Refills: 0 | Status: SHIPPED | OUTPATIENT
Start: 2020-05-25

## 2020-05-25 RX ADMIN — LIDOCAINE HYDROCHLORIDE: 20 SOLUTION ORAL; TOPICAL at 14:48

## 2020-05-25 NOTE — ED PROVIDER NOTES
"Subjective   This patient states for several weeks he has been having difficulty swallowing as he feels like food gets hung.  He has no other complaints.  He did get evaluated by an urgent treatment provider on 5/20 and had a strep and COVID swab which were reportedly negative.  He has no headache, dizziness, chest pain, short of breath, nausea vomiting or diarrhea.  He does have a history of GERD, hypothyroid, CKD and sees Dr. Benjamin for his kidney issues.  He used to be on dialysis but has not been for many years.  He states he is able to tolerate liquids without difficulty however any solid foods seem to \"get hung\" in his throat and he points to his thyroid area and just above indicate area where he feels like it gets hung.          Review of Systems   Constitutional: Negative.    HENT: Positive for trouble swallowing.    Eyes: Negative.    Respiratory: Negative.    Cardiovascular: Negative.    Gastrointestinal: Negative.    Genitourinary: Negative.    Musculoskeletal: Negative.    Skin: Negative.    Allergic/Immunologic: Negative.    Neurological: Negative.    Psychiatric/Behavioral: Negative.    All other systems reviewed and are negative.      Past Medical History:   Diagnosis Date   • Elevated PSA    • Hydronephrosis    • Hypothyroid    • Kidney failure    • Urinary retention        No Known Allergies    Past Surgical History:   Procedure Laterality Date   • CYSTOSCOPY TRANSURETHRAL RESECTION OF PROSTATE         History reviewed. No pertinent family history.    Social History     Socioeconomic History   • Marital status:      Spouse name: Not on file   • Number of children: Not on file   • Years of education: Not on file   • Highest education level: Not on file   Occupational History     Employer: RETIRED   Tobacco Use   • Smoking status: Never Smoker   • Smokeless tobacco: Never Used   Substance and Sexual Activity   • Alcohol use: No   • Drug use: No   • Sexual activity: Defer           Objective "   Physical Exam   Constitutional: He is oriented to person, place, and time. He appears well-developed and well-nourished.   HENT:   Head: Normocephalic and atraumatic.   Mouth/Throat: Oropharynx is clear and moist.   Eyes: EOM are normal.   Neck: Normal range of motion and full passive range of motion without pain. Neck supple. No edema, no erythema and normal range of motion present. No thyroid mass present.   Cardiovascular: Normal rate and regular rhythm.   Pulmonary/Chest: Effort normal.   Abdominal: Soft.   Musculoskeletal: Normal range of motion.   Lymphadenopathy:     He has no cervical adenopathy.   Neurological: He is alert and oriented to person, place, and time.   Skin: Skin is warm and dry.   Psychiatric: He has a normal mood and affect. His behavior is normal. Thought content normal.   Nursing note and vitals reviewed.      Procedures           ED Course  ED Course as of May 25 1440   Mon May 25, 2020   1246 WBC: 8.04 [TM]   1246 Hemoglobin: 15.1 [TM]   1246 Hematocrit: 46.3 [TM]      ED Course User Index  [TM] Porter Garcia PA-C                                           [facility]  1436  Discussed with Dr. Villegas and he examined patient at bedside as well.  Will treat patient with GI cocktail, Pepcid and have him see GI for an upper endoscopy as we believe his symptoms are likely due to gastritis/esophagitis.  He has no focal neuro deficits, gait stable, mentating appropriately.  Very well-appearing.  Final diagnoses:   Dysphagia, unspecified type            Porter Garcia PA-C  05/25/20 1440

## 2020-05-26 NOTE — PROGRESS NOTES
"Patient: Jason Baugh    YOB: 1935    Date: 05/27/2020    Primary Care Provider: Ari Ricardo MD    Chief Complaint   Patient presents with   • Difficulty Swallowing       SUBJECTIVE:    History of present illness:  I saw the patient in the office  today as a consultation for evaluation and treatment of bouts of dysphagia which has been ongoing for 3-4 weeks.  Patient stated that when it started he was having painful swallowing but that is better now.  Patient admitted to recent weight loss in the excess of 15 pounds within the last \"few weeks.\"  Patient also complains of \"acid reflux.\"  Patient denies change in bowel habits including dark colored stool and/or visible rectal bleeding.  Patient had CT scan of the soft tissue of the neck performed 05/25/2020, it showed degenerative changes in the spine and a linear nodular type density in the left aspect of the thyroid.  Mild heartburn symptoms, occasional hiccups.  No previous EGD and last colonoscopy was over 10 years ago.  No rectal bleeding or melena.    The following portions of the patient's history were reviewed and updated as appropriate: allergies, current medications, past family history, past medical history, past social history, past surgical history and problem list.    Review of Systems   Constitutional: Positive for unexpected weight change. Negative for chills and fever.   HENT: Positive for trouble swallowing. Negative for voice change.    Eyes: Negative for visual disturbance.   Respiratory: Negative for apnea, cough, chest tightness, shortness of breath and wheezing.    Cardiovascular: Negative for chest pain, palpitations and leg swelling.   Gastrointestinal: Negative for abdominal distention, abdominal pain, anal bleeding, blood in stool, constipation, diarrhea, nausea, rectal pain and vomiting.   Endocrine: Negative for cold intolerance and heat intolerance.   Genitourinary: Negative for difficulty urinating, dysuria, flank pain, " "scrotal swelling and testicular pain.   Musculoskeletal: Negative for back pain, gait problem and joint swelling.   Skin: Negative for color change, rash and wound.   Neurological: Negative for dizziness, syncope, speech difficulty, weakness, numbness and headaches.   Hematological: Negative for adenopathy. Does not bruise/bleed easily.   Psychiatric/Behavioral: Negative for confusion. The patient is not nervous/anxious.        History:  Past Medical History:   Diagnosis Date   • Elevated PSA    • Hydronephrosis    • Hypothyroid    • Kidney failure    • Urinary retention        Past Surgical History:   Procedure Laterality Date   • CYSTOSCOPY TRANSURETHRAL RESECTION OF PROSTATE         History reviewed. No pertinent family history.    Social History     Tobacco Use   • Smoking status: Never Smoker   • Smokeless tobacco: Never Used   Substance Use Topics   • Alcohol use: No   • Drug use: No       Allergies:  No Known Allergies    Medications:    Current Outpatient Medications:   •  amLODIPine (NORVASC) 10 MG tablet, , Disp: , Rfl:   •  famotidine (PEPCID) 40 MG tablet, Take 1 tablet by mouth Daily., Disp: 30 tablet, Rfl: 0  •  levothyroxine (SYNTHROID, LEVOTHROID) 88 MCG tablet, , Disp: , Rfl:   •  pravastatin (PRAVACHOL) 40 MG tablet, , Disp: , Rfl:   •  sodium bicarbonate 650 MG tablet, TAKE 1 TABLET BY MOUTH TWICE A DAY, Disp: , Rfl: 3  •  tamsulosin (FLOMAX) 0.4 MG capsule 24 hr capsule, Take 1 capsule by mouth Daily., Disp: 90 capsule, Rfl: 3    OBJECTIVE:    Vital Signs:   Vitals:    05/27/20 0843   BP: 112/64   Pulse: 86   Resp: 18   Temp: 97.6 °F (36.4 °C)   TempSrc: Temporal   SpO2: 99%   Weight: 73.5 kg (162 lb)   Height: 182.9 cm (72\")       Physical Exam:   General Appearance:    Alert, cooperative, in no acute distress   Head:    Normocephalic, without obvious abnormality, atraumatic   Eyes:            Lids and lashes normal, conjunctivae and sclerae normal, no   icterus, no pallor, corneas clear, PERRLA "   Ears:    Ears appear intact with no abnormalities noted   Throat:   No oral lesions, no thrush, oral mucosa moist   Neck:   No adenopathy, supple, trachea midline, no thyromegaly, no   carotid bruit, no JVD   Lungs:     Clear to auscultation,respirations regular, even and                  unlabored    Heart:    Regular rhythm and normal rate, normal S1 and S2, no            murmur, no gallop, no rub, no click   Chest Wall:    No abnormalities observed   Abdomen:     Normal bowel sounds, no masses, no organomegaly, soft        with midepigastric tenderness.  No rebound or guarding.  No abdominal wall hernias.   Extremities:   Moves all extremities well, no edema, no cyanosis, no             redness   Pulses:   Pulses palpable and equal bilaterally   Skin:   No bleeding, bruising or rash   Lymph nodes:   No palpable adenopathy   Neurologic:   Cranial nerves 2 - 12 grossly intact, sensation intact     Results Review:   I reviewed the patient's new clinical results.    Review of Systems was reviewed and confirmed as accurate as documented by the MA.    ASSESSMENT/PLAN:    1. Gastroesophageal reflux disease, esophagitis presence not specified    2. Oral phase dysphagia    3. Screening for colon cancer    4. Thyroid nodule        I did have a detailed and extensive discussion with the patient in the office and they understand that they need to undergo upper endoscopy and colonoscopy. Full risks and benefits of operative versus nonoperative intervention were discussed with the patient and these include bleeding and esophageal injury. The patient understands, agrees, and wishes to proceed with the surgical treatment plan as mentioned above. The patient had no questions for me at the end of the discussion.   Ultrasound does not identify left thyroid nodule.  Patient reassured.     I discussed the patients findings and my recommendations with patient.     Electronically signed by Irene Art MD  05/27/20 12:56  PM              Portions of this note have been scribed for Irene Art MD by Gabi Vincent. 5/27/2020  09:03

## 2020-05-27 ENCOUNTER — OFFICE VISIT (OUTPATIENT)
Dept: SURGERY | Facility: CLINIC | Age: 85
End: 2020-05-27

## 2020-05-27 VITALS
WEIGHT: 162 LBS | SYSTOLIC BLOOD PRESSURE: 112 MMHG | HEART RATE: 86 BPM | DIASTOLIC BLOOD PRESSURE: 64 MMHG | HEIGHT: 72 IN | BODY MASS INDEX: 21.94 KG/M2 | OXYGEN SATURATION: 99 % | RESPIRATION RATE: 18 BRPM | TEMPERATURE: 97.6 F

## 2020-05-27 DIAGNOSIS — R13.11 ORAL PHASE DYSPHAGIA: ICD-10-CM

## 2020-05-27 DIAGNOSIS — Z01.818 PREOP TESTING: Primary | ICD-10-CM

## 2020-05-27 DIAGNOSIS — E04.1 THYROID NODULE: ICD-10-CM

## 2020-05-27 DIAGNOSIS — K21.9 GASTROESOPHAGEAL REFLUX DISEASE, ESOPHAGITIS PRESENCE NOT SPECIFIED: Primary | ICD-10-CM

## 2020-05-27 DIAGNOSIS — Z12.11 SCREENING FOR COLON CANCER: ICD-10-CM

## 2020-05-27 PROCEDURE — 99203 OFFICE O/P NEW LOW 30 MIN: CPT | Performed by: SURGERY

## 2020-05-27 RX ORDER — POLYETHYLENE GLYCOL 3350 17 G/17G
POWDER, FOR SOLUTION ORAL
Qty: 238 G | Refills: 0 | Status: SHIPPED | OUTPATIENT
Start: 2020-05-27

## 2020-05-27 RX ORDER — BISACODYL 5 MG/1
TABLET, DELAYED RELEASE ORAL
Qty: 4 TABLET | Refills: 0 | Status: SHIPPED | OUTPATIENT
Start: 2020-05-27

## 2020-06-01 ENCOUNTER — LAB (OUTPATIENT)
Dept: LAB | Facility: HOSPITAL | Age: 85
End: 2020-06-01

## 2020-06-01 DIAGNOSIS — Z01.818 PREOP TESTING: ICD-10-CM

## 2020-06-01 PROCEDURE — U0004 COV-19 TEST NON-CDC HGH THRU: HCPCS

## 2020-06-01 PROCEDURE — C9803 HOPD COVID-19 SPEC COLLECT: HCPCS

## 2020-06-01 PROCEDURE — U0002 COVID-19 LAB TEST NON-CDC: HCPCS

## 2020-06-02 LAB
REF LAB TEST METHOD: NORMAL
SARS-COV-2 RNA RESP QL NAA+PROBE: NOT DETECTED

## 2020-06-04 ENCOUNTER — HOSPITAL ENCOUNTER (OUTPATIENT)
Facility: HOSPITAL | Age: 85
Setting detail: HOSPITAL OUTPATIENT SURGERY
Discharge: HOME | End: 2020-06-04
Attending: SURGERY | Admitting: SURGERY

## 2020-06-04 ENCOUNTER — ANESTHESIA (OUTPATIENT)
Dept: GASTROENTEROLOGY | Facility: HOSPITAL | Age: 85
End: 2020-06-04

## 2020-06-04 ENCOUNTER — ANESTHESIA EVENT (OUTPATIENT)
Dept: GASTROENTEROLOGY | Facility: HOSPITAL | Age: 85
End: 2020-06-04

## 2020-06-04 VITALS
HEART RATE: 76 BPM | SYSTOLIC BLOOD PRESSURE: 112 MMHG | RESPIRATION RATE: 16 BRPM | OXYGEN SATURATION: 100 % | HEIGHT: 72 IN | WEIGHT: 158 LBS | TEMPERATURE: 97.6 F | BODY MASS INDEX: 21.4 KG/M2 | DIASTOLIC BLOOD PRESSURE: 57 MMHG

## 2020-06-04 DIAGNOSIS — Z12.11 SCREENING FOR COLON CANCER: ICD-10-CM

## 2020-06-04 DIAGNOSIS — R13.11 ORAL PHASE DYSPHAGIA: ICD-10-CM

## 2020-06-04 DIAGNOSIS — K21.9 GASTROESOPHAGEAL REFLUX DISEASE, ESOPHAGITIS PRESENCE NOT SPECIFIED: ICD-10-CM

## 2020-06-04 DIAGNOSIS — E04.1 THYROID NODULE: ICD-10-CM

## 2020-06-04 LAB
ANION GAP SERPL CALCULATED.3IONS-SCNC: 16.4 MMOL/L (ref 5–15)
BUN BLD-MCNC: 53 MG/DL (ref 8–23)
BUN/CREAT SERPL: 17.2 (ref 7–25)
CALCIUM SPEC-SCNC: 11.3 MG/DL (ref 8.6–10.5)
CHLORIDE SERPL-SCNC: 102 MMOL/L (ref 98–107)
CO2 SERPL-SCNC: 14.6 MMOL/L (ref 22–29)
CREAT BLD-MCNC: 3.09 MG/DL (ref 0.76–1.27)
GFR SERPL CREATININE-BSD FRML MDRD: 19 ML/MIN/1.73
GLUCOSE BLD-MCNC: 105 MG/DL (ref 65–99)
POTASSIUM BLD-SCNC: 4.8 MMOL/L (ref 3.5–5.2)
SODIUM BLD-SCNC: 133 MMOL/L (ref 136–145)

## 2020-06-04 PROCEDURE — 88305 TISSUE EXAM BY PATHOLOGIST: CPT | Performed by: SURGERY

## 2020-06-04 PROCEDURE — 80048 BASIC METABOLIC PNL TOTAL CA: CPT | Performed by: NURSE ANESTHETIST, CERTIFIED REGISTERED

## 2020-06-04 PROCEDURE — 25010000002 PROPOFOL 200 MG/20ML EMULSION: Performed by: NURSE ANESTHETIST, CERTIFIED REGISTERED

## 2020-06-04 RX ORDER — ONDANSETRON 2 MG/ML
4 INJECTION INTRAMUSCULAR; INTRAVENOUS ONCE AS NEEDED
Status: DISCONTINUED | OUTPATIENT
Start: 2020-06-04 | End: 2020-06-04 | Stop reason: HOSPADM

## 2020-06-04 RX ORDER — SODIUM CHLORIDE 0.9 % (FLUSH) 0.9 %
10 SYRINGE (ML) INJECTION AS NEEDED
Status: DISCONTINUED | OUTPATIENT
Start: 2020-06-04 | End: 2020-06-04 | Stop reason: HOSPADM

## 2020-06-04 RX ORDER — MAGNESIUM HYDROXIDE 1200 MG/15ML
LIQUID ORAL AS NEEDED
Status: DISCONTINUED | OUTPATIENT
Start: 2020-06-04 | End: 2020-06-04 | Stop reason: HOSPADM

## 2020-06-04 RX ORDER — PROPOFOL 10 MG/ML
INJECTION, EMULSION INTRAVENOUS AS NEEDED
Status: DISCONTINUED | OUTPATIENT
Start: 2020-06-04 | End: 2020-06-04 | Stop reason: SURG

## 2020-06-04 RX ORDER — SODIUM CHLORIDE, SODIUM LACTATE, POTASSIUM CHLORIDE, CALCIUM CHLORIDE 600; 310; 30; 20 MG/100ML; MG/100ML; MG/100ML; MG/100ML
1000 INJECTION, SOLUTION INTRAVENOUS CONTINUOUS
Status: DISCONTINUED | OUTPATIENT
Start: 2020-06-04 | End: 2020-06-04 | Stop reason: HOSPADM

## 2020-06-04 RX ORDER — LIDOCAINE HYDROCHLORIDE 20 MG/ML
INJECTION, SOLUTION INTRAVENOUS AS NEEDED
Status: DISCONTINUED | OUTPATIENT
Start: 2020-06-04 | End: 2020-06-04 | Stop reason: SURG

## 2020-06-04 RX ORDER — KETAMINE HCL IN NACL, ISO-OSM 100MG/10ML
SYRINGE (ML) INJECTION AS NEEDED
Status: DISCONTINUED | OUTPATIENT
Start: 2020-06-04 | End: 2020-06-04 | Stop reason: SURG

## 2020-06-04 RX ORDER — BUPIVACAINE HCL/0.9 % NACL/PF 0.125 %
PLASTIC BAG, INJECTION (ML) EPIDURAL AS NEEDED
Status: DISCONTINUED | OUTPATIENT
Start: 2020-06-04 | End: 2020-06-04 | Stop reason: SURG

## 2020-06-04 RX ADMIN — Medication 100 MCG: at 07:41

## 2020-06-04 RX ADMIN — Medication 20 MG: at 07:17

## 2020-06-04 RX ADMIN — PROPOFOL 60 MG: 10 INJECTION, EMULSION INTRAVENOUS at 07:17

## 2020-06-04 RX ADMIN — PROPOFOL 50 MG: 10 INJECTION, EMULSION INTRAVENOUS at 07:23

## 2020-06-04 RX ADMIN — SODIUM CHLORIDE, POTASSIUM CHLORIDE, SODIUM LACTATE AND CALCIUM CHLORIDE: 600; 310; 30; 20 INJECTION, SOLUTION INTRAVENOUS at 07:00

## 2020-06-04 RX ADMIN — Medication 100 MCG: at 07:27

## 2020-06-04 RX ADMIN — PROPOFOL 50 MG: 10 INJECTION, EMULSION INTRAVENOUS at 07:20

## 2020-06-04 RX ADMIN — PROPOFOL 50 MG: 10 INJECTION, EMULSION INTRAVENOUS at 07:27

## 2020-06-04 RX ADMIN — LIDOCAINE HYDROCHLORIDE 50 MG: 20 INJECTION, SOLUTION INTRAVENOUS at 07:17

## 2020-06-04 RX ADMIN — PROPOFOL 50 MG: 10 INJECTION, EMULSION INTRAVENOUS at 07:32

## 2020-06-04 NOTE — ANESTHESIA PREPROCEDURE EVALUATION
Anesthesia Evaluation     Patient summary reviewed and Nursing notes reviewed   no history of anesthetic complications:  NPO Solid Status: > 8 hours  NPO Liquid Status: > 8 hours           Airway   Mallampati: II  TM distance: >3 FB  Neck ROM: full  No difficulty expected  Dental - normal exam   (+) edentulous    Pulmonary - negative pulmonary ROS and normal exam   (-) not a smoker  Cardiovascular - normal exam  Exercise tolerance: good (4-7 METS)    ECG reviewed    (+) hypertension well controlled less than 2 medications,     ROS comment: 10/2018 ECG NSR with low QRS voltage.     Neuro/Psych- negative ROS  GI/Hepatic/Renal/Endo    (+)  GERD,  renal disease CRI, thyroid problem hypothyroidism    Musculoskeletal (-) negative ROS    Abdominal  - normal exam   Substance History - negative use  (-) alcohol use, drug use     OB/GYN negative ob/gyn ROS         Other        ROS/Med Hx Other: Urinary tract infection  Generalized abdominal pain  Gastroesophageal reflux disease  Oral phase dysphagia  Screening for colon cancer  Thyroid nodule  hydroinephrosis  Kidney failure  Hypothyroid  Urinary retention  Elevated PSA    Hx of AV fistula        Phys Exam Other: AV fistula with positive strong thrill right upper extremity              Anesthesia Plan    ASA 3     MAC   (Patient advised that intravenous sedation would be utilized as primary anesthetic technique. Every effort will be made to make sure patient is comfortable. Patient advised that they may experience recall of events of the procedure. Patient verbalized understanding and agreed to plan. )  intravenous induction     Anesthetic plan, all risks, benefits, and alternatives have been provided, discussed and informed consent has been obtained with: patient.    Plan discussed with CRNA.

## 2020-06-04 NOTE — ANESTHESIA POSTPROCEDURE EVALUATION
Patient: Jason Baugh    Procedure Summary     Date:  06/04/20 Room / Location:  [facility] ENDOSCOPY 3 / [facility] ENDOSCOPY    Anesthesia Start:  0716 Anesthesia Stop:  0750    Procedures:       ESOPHAGOGASTRODUODENOSCOPY WITH BIOPSY (N/A Esophagus)      COLONOSCOPY (N/A ) Diagnosis:       Gastroesophageal reflux disease, esophagitis presence not specified      Oral phase dysphagia      Screening for colon cancer      Thyroid nodule      (Gastroesophageal reflux disease, esophagitis presence not specified [K21.9])      (Oral phase dysphagia [R13.11])      (Screening for colon cancer [Z12.11])      (Thyroid nodule [E04.1])    Surgeon:  Irene Art MD Provider:  Kamari Bean CRNA    Anesthesia Type:  MAC ASA Status:  3          Anesthesia Type: MAC    Vitals  Vitals Value Taken Time   /57 6/4/2020  8:22 AM   Temp 97.6 °F (36.4 °C) 6/4/2020  8:22 AM   Pulse 76 6/4/2020  8:22 AM   Resp 16 6/4/2020  8:22 AM   SpO2 100 % 6/4/2020  8:22 AM           Post Anesthesia Care and Evaluation    Patient location during evaluation: PHASE II  Patient participation: complete - patient participated  Level of consciousness: awake and sleepy but conscious  Pain management: adequate  Airway patency: patent  Anesthetic complications: No anesthetic complications  PONV Status: none  Cardiovascular status: acceptable and hemodynamically stable  Respiratory status: acceptable, room air, nonlabored ventilation and spontaneous ventilation  Hydration status: acceptable

## 2020-06-09 LAB
LAB AP CASE REPORT: NORMAL
PATH REPORT.FINAL DX SPEC: NORMAL

## 2020-06-22 DIAGNOSIS — N40.1 BPH WITH URINARY OBSTRUCTION: ICD-10-CM

## 2020-06-22 DIAGNOSIS — N13.8 BPH WITH URINARY OBSTRUCTION: ICD-10-CM

## 2020-06-22 RX ORDER — TAMSULOSIN HYDROCHLORIDE 0.4 MG/1
1 CAPSULE ORAL DAILY
Qty: 90 CAPSULE | Refills: 3 | Status: SHIPPED | OUTPATIENT
Start: 2020-06-22 | End: 2020-07-30 | Stop reason: SDUPTHER

## 2020-07-30 ENCOUNTER — OFFICE VISIT (OUTPATIENT)
Dept: UROLOGY | Facility: CLINIC | Age: 85
End: 2020-07-30

## 2020-07-30 VITALS
HEART RATE: 83 BPM | TEMPERATURE: 98.1 F | OXYGEN SATURATION: 99 % | RESPIRATION RATE: 16 BRPM | SYSTOLIC BLOOD PRESSURE: 129 MMHG | DIASTOLIC BLOOD PRESSURE: 88 MMHG

## 2020-07-30 DIAGNOSIS — N13.8 BPH WITH URINARY OBSTRUCTION: ICD-10-CM

## 2020-07-30 DIAGNOSIS — N52.01 ERECTILE DYSFUNCTION DUE TO ARTERIAL INSUFFICIENCY: ICD-10-CM

## 2020-07-30 DIAGNOSIS — N40.0 BENIGN PROSTATIC HYPERPLASIA, UNSPECIFIED WHETHER LOWER URINARY TRACT SYMPTOMS PRESENT: Primary | ICD-10-CM

## 2020-07-30 DIAGNOSIS — N40.1 BPH WITH URINARY OBSTRUCTION: ICD-10-CM

## 2020-07-30 LAB
BILIRUB BLD-MCNC: NEGATIVE MG/DL
CLARITY, POC: CLEAR
COLOR UR: YELLOW
GLUCOSE UR STRIP-MCNC: NEGATIVE MG/DL
KETONES UR QL: NEGATIVE
LEUKOCYTE EST, POC: NEGATIVE
NITRITE UR-MCNC: NEGATIVE MG/ML
PH UR: 6 [PH] (ref 5–8)
PROT UR STRIP-MCNC: ABNORMAL MG/DL
RBC # UR STRIP: NEGATIVE /UL
SP GR UR: 1.01 (ref 1–1.03)
UROBILINOGEN UR QL: NORMAL

## 2020-07-30 PROCEDURE — 99213 OFFICE O/P EST LOW 20 MIN: CPT | Performed by: UROLOGY

## 2020-07-30 PROCEDURE — 81003 URINALYSIS AUTO W/O SCOPE: CPT | Performed by: UROLOGY

## 2020-07-30 RX ORDER — SILDENAFIL CITRATE 20 MG/1
TABLET ORAL
Qty: 30 TABLET | Refills: 3 | Status: SHIPPED | OUTPATIENT
Start: 2020-07-30 | End: 2020-11-13 | Stop reason: SDUPTHER

## 2020-07-30 RX ORDER — TAMSULOSIN HYDROCHLORIDE 0.4 MG/1
1 CAPSULE ORAL DAILY
Qty: 90 CAPSULE | Refills: 3 | Status: SHIPPED | OUTPATIENT
Start: 2020-07-30

## 2020-07-30 NOTE — PROGRESS NOTES
Chief Complaint  Benign Prostatic Hypertrophy (6 month follow up.)        DESHAUN Baugh is a 84 y.o. male who returns today for an annual checkup 20 years after he presented with urinary retention and bilateral hydronephrosis with renal failure.  He underwent TURP and was placed on dialysis with gradual improvement in his renal function.  He is currently voiding without difficulty on Flomax and has clear urine today.  He currently has a creatinine around 3.    He is requesting refills on sildenafil for his erectile dysfunction.    Vitals:    07/30/20 1311   BP: 129/88   Pulse: 83   Resp: 16   Temp: 98.1 °F (36.7 °C)   SpO2: 99%       Past Medical History  Past Medical History:   Diagnosis Date   • Elevated PSA    • Hydronephrosis    • Hypertension    • Hypothyroid    • Kidney failure    • Urinary retention        Past Surgical History  Past Surgical History:   Procedure Laterality Date   • ARTERIOVENOUS FISTULA     • COLONOSCOPY     • COLONOSCOPY N/A 6/4/2020    Procedure: COLONOSCOPY;  Surgeon: Irene Art MD;  Location: [facility] ENDOSCOPY;  Service: Gastroenterology;  Laterality: N/A;   • CYSTOSCOPY TRANSURETHRAL RESECTION OF PROSTATE     • ENDOSCOPY     • ENDOSCOPY N/A 6/4/2020    Procedure: ESOPHAGOGASTRODUODENOSCOPY WITH BIOPSY;  Surgeon: Irene Art MD;  Location: [facility] ENDOSCOPY;  Service: Gastroenterology;  Laterality: N/A;       Medications  has a current medication list which includes the following prescription(s): amlodipine, bisacodyl, famotidine, levothyroxine, polyethylene glycol, pravastatin, sodium bicarbonate, and tamsulosin.      Allergies  No Known Allergies    Social History  Social History     Socioeconomic History   • Marital status:      Spouse name: Not on file   • Number of children: Not on file   • Years of education: Not on file   • Highest education level: Not on file   Occupational History     Employer: RETIRED   Tobacco Use   • Smoking status: Never Smoker   • Smokeless  tobacco: Never Used   Substance and Sexual Activity   • Alcohol use: No   • Drug use: No   • Sexual activity: Defer       Family History  He has no family history of bladder or kidney cancer  He has no family history of kidney stones      AUA Symptom Score:      Review of Systems  Review of Systems   Constitutional: Negative for activity change, appetite change, chills, fatigue, fever, unexpected weight gain and unexpected weight loss.   Respiratory: Negative for apnea, cough, chest tightness, shortness of breath, wheezing and stridor.    Cardiovascular: Negative for chest pain, palpitations and leg swelling.   Gastrointestinal: Negative for abdominal distention, abdominal pain, anal bleeding, blood in stool, constipation, diarrhea, nausea, rectal pain, vomiting, GERD and indigestion.   Genitourinary: Positive for erectile dysfunction. Negative for decreased libido, decreased urine volume, difficulty urinating, discharge, dysuria, flank pain, frequency, genital sores, hematuria, nocturia, penile pain, penile swelling, scrotal swelling, testicular pain, urgency and urinary incontinence.   Musculoskeletal: Negative for back pain and joint swelling.   Neurological: Negative for tremors, seizures, speech difficulty, weakness and numbness.   Psychiatric/Behavioral: Negative for agitation, decreased concentration, sleep disturbance, depressed mood and stress. The patient is not nervous/anxious.        Physical Exam  Physical Exam   Constitutional: He is oriented to person, place, and time. He appears well-developed and well-nourished.   HENT:   Head: Normocephalic and atraumatic.   Neck: Normal range of motion.   Pulmonary/Chest: Effort normal. No respiratory distress.   Abdominal: Soft. He exhibits no distension and no mass. There is no tenderness. No hernia.   Genitourinary: Rectum normal and prostate normal.   Musculoskeletal: Normal range of motion.   Lymphadenopathy:     He has no cervical adenopathy.   Neurological:  He is alert and oriented to person, place, and time.   Skin: Skin is warm and dry.   Psychiatric: He has a normal mood and affect. His behavior is normal.   Vitals reviewed.      Labs Recent and today in the office:  Results for orders placed or performed in visit on 07/30/20   POC Urinalysis Dipstick, Automated   Result Value Ref Range    Color Yellow Yellow, Straw, Dark Yellow, Muriel    Clarity, UA Clear Clear    Specific Gravity  1.015 1.005 - 1.030    pH, Urine 6.0 5.0 - 8.0    Leukocytes Negative Negative    Nitrite, UA Negative Negative    Protein, POC Trace (A) Negative mg/dL    Glucose, UA Negative Negative, 1000 mg/dL (3+) mg/dL    Ketones, UA Negative Negative    Urobilinogen, UA Normal Normal    Bilirubin Negative Negative    Blood, UA Negative Negative         Assessment & Plan  Erectile dysfunction: Currently doing very well on generic sildenafil    BPH with outlet obstruction: Digital rectal exam is benign and he is voiding adequately on Flomax.  PSA is obtained and if normal he can return on an annual basis.

## 2020-07-31 LAB — PSA SERPL-MCNC: 1.38 NG/ML (ref 0–4)

## 2021-02-09 ENCOUNTER — OFFICE VISIT (OUTPATIENT)
Dept: UROLOGY | Facility: CLINIC | Age: 86
End: 2021-02-09

## 2021-02-09 VITALS
SYSTOLIC BLOOD PRESSURE: 136 MMHG | HEIGHT: 72 IN | HEART RATE: 85 BPM | WEIGHT: 158 LBS | BODY MASS INDEX: 21.4 KG/M2 | RESPIRATION RATE: 16 BRPM | OXYGEN SATURATION: 98 % | DIASTOLIC BLOOD PRESSURE: 68 MMHG | TEMPERATURE: 99 F

## 2021-02-09 DIAGNOSIS — N52.01 ERECTILE DYSFUNCTION DUE TO ARTERIAL INSUFFICIENCY: ICD-10-CM

## 2021-02-09 DIAGNOSIS — N40.0 BENIGN PROSTATIC HYPERPLASIA, UNSPECIFIED WHETHER LOWER URINARY TRACT SYMPTOMS PRESENT: Primary | ICD-10-CM

## 2021-02-09 PROCEDURE — 81003 URINALYSIS AUTO W/O SCOPE: CPT | Performed by: UROLOGY

## 2021-02-09 PROCEDURE — 99213 OFFICE O/P EST LOW 20 MIN: CPT | Performed by: UROLOGY

## 2021-02-09 RX ORDER — SILDENAFIL CITRATE 20 MG/1
TABLET ORAL
Qty: 30 TABLET | Refills: 3 | Status: SHIPPED | OUTPATIENT
Start: 2021-02-09

## 2021-02-09 RX ORDER — TELMISARTAN 80 MG/1
80 TABLET ORAL DAILY
COMMUNITY
Start: 2021-01-21

## 2021-02-09 RX ORDER — MAGNESIUM OXIDE 400 MG/1
1 TABLET ORAL DAILY
COMMUNITY
Start: 2020-12-28

## 2021-02-09 NOTE — PROGRESS NOTES
Chief Complaint  Benign Prostatic Hypertrophy        DESHAUN Baugh is a 85 y.o. male who returns today for an annual checkup presenting in urinary retention some 20 years ago and treated by me with TURP.  He was in renal failure at that time and underwent dialysis for 9 months.  Fortunately his renal function gradually improved but he is still followed by Dr. Benjamin.  Currently has no difficulty voiding except for some frequency.  He has Flomax to take but cannot tell if it really does any good so he takes it intermittently.  His urine today is clear.  Digital rectal exam and PSA have always been benign and at this point he is a candidate for palliative therapy only.    He is requesting a prescription for Viagra for his erectile dysfunction and this will be provided.    Vitals:    02/09/21 1309   BP: 136/68   Pulse: 85   Resp: 16   Temp: 99 °F (37.2 °C)   SpO2: 98%       Past Medical History  Past Medical History:   Diagnosis Date   • Elevated PSA    • Hydronephrosis    • Hypertension    • Hypothyroid    • Kidney failure    • Urinary retention        Past Surgical History  Past Surgical History:   Procedure Laterality Date   • ARTERIOVENOUS FISTULA     • COLONOSCOPY     • COLONOSCOPY N/A 6/4/2020    Procedure: COLONOSCOPY;  Surgeon: Irene Art MD;  Location: [facility] ENDOSCOPY;  Service: Gastroenterology;  Laterality: N/A;   • CYSTOSCOPY TRANSURETHRAL RESECTION OF PROSTATE     • ENDOSCOPY     • ENDOSCOPY N/A 6/4/2020    Procedure: ESOPHAGOGASTRODUODENOSCOPY WITH BIOPSY;  Surgeon: Irene Art MD;  Location: [facility] ENDOSCOPY;  Service: Gastroenterology;  Laterality: N/A;       Medications  has a current medication list which includes the following prescription(s): amlodipine, bisacodyl, famotidine, levothyroxine, magnesium oxide, polyethylene glycol, pravastatin, sodium bicarbonate, tamsulosin, telmisartan, and sildenafil.      Allergies  No Known Allergies    Social History  Social History     Socioeconomic  Emily Mortimer is a 28 year old,   here with postcoital bleeding. Pt has had this on and off for 10 years.  However, she had one episode of  Particularly heavy bleeding in October.      REVIEW OF SYSTEMS:  I have personally reviewed the ROS as entered by the medical assistant, and have addressed pertinent issues.    OBJECTIVE:  Visit Vitals  /72   Pulse 82   Wt 59.9 kg   LMP 2019   BMI 21.98 kg/m²     Patient's last menstrual period was 2019.      PELVIC: External genitalia normal        [place]'s glands, urethra, [place]'s glands are normal       Urethral meatus normal       Bladder normal       Vagina normal                 Uterus normal                 Cervix normal       Adnexae normal       Anus & perineum normal     IMPRESSION:  {[id number]}        PLAN:   History   • Marital status:      Spouse name: Not on file   • Number of children: Not on file   • Years of education: Not on file   • Highest education level: Not on file   Occupational History     Employer: RETIRED   Tobacco Use   • Smoking status: Never Smoker   • Smokeless tobacco: Never Used   Substance and Sexual Activity   • Alcohol use: No   • Drug use: No   • Sexual activity: Defer       Family History  He has no family history of bladder or kidney cancer  He has no family history of kidney stones      AUA Symptom Score:      Review of Systems  Review of Systems   Constitutional: Negative for activity change, appetite change, chills, fatigue, fever, unexpected weight gain and unexpected weight loss.   Respiratory: Negative for apnea, cough, chest tightness, shortness of breath, wheezing and stridor.    Cardiovascular: Negative for chest pain, palpitations and leg swelling.   Gastrointestinal: Negative for abdominal distention, abdominal pain, anal bleeding, blood in stool, constipation, diarrhea, nausea, rectal pain, vomiting, GERD and indigestion.   Genitourinary: Positive for frequency and erectile dysfunction. Negative for decreased libido, decreased urine volume, difficulty urinating, discharge, dysuria, flank pain, genital sores, hematuria, nocturia, penile pain, penile swelling, scrotal swelling, testicular pain, urgency and urinary incontinence.   Musculoskeletal: Negative for back pain and joint swelling.   Neurological: Negative for tremors, seizures, speech difficulty, weakness and numbness.   Psychiatric/Behavioral: Negative for agitation, decreased concentration, sleep disturbance, depressed mood and stress. The patient is not nervous/anxious.        Physical Exam  Physical Exam  Vitals signs reviewed.   Constitutional:       Appearance: He is well-developed.   HENT:      Head: Normocephalic and atraumatic.   Neck:      Musculoskeletal: Normal range of motion.   Pulmonary:      Effort:  Pulmonary effort is normal. No respiratory distress.   Abdominal:      General: There is no distension.      Palpations: Abdomen is soft. There is no mass.      Tenderness: There is no abdominal tenderness.      Hernia: No hernia is present.   Musculoskeletal: Normal range of motion.   Lymphadenopathy:      Cervical: No cervical adenopathy.   Skin:     General: Skin is warm and dry.   Neurological:      Mental Status: He is alert and oriented to person, place, and time.   Psychiatric:         Behavior: Behavior normal.         Labs Recent and today in the office:  Results for orders placed or performed in visit on 02/09/21   POC Urinalysis Dipstick, Automated    Specimen: Urine   Result Value Ref Range    Color Yellow Yellow, Straw, Dark Yellow, Muriel    Clarity, UA Clear Clear    Specific Gravity  1.015 1.005 - 1.030    pH, Urine 6.0 5.0 - 8.0    Leukocytes Negative Negative    Nitrite, UA Negative Negative    Protein, POC 1+ (A) Negative mg/dL    Glucose, UA Negative Negative, 1000 mg/dL (3+) mg/dL    Ketones, UA Negative Negative    Urobilinogen, UA Normal Normal    Bilirubin Negative Negative    Blood, UA Negative Negative         Assessment & Plan  BPH with outlet obstruction: Patient is given the option of taking Flomax or not taking it depending on the benefit.  A return on an annual basis and as needed.    Erectile dysfunction: Generic sildenafil was prescribed as requested.

## 2021-03-25 ENCOUNTER — OFFICE VISIT (OUTPATIENT)
Dept: SURGERY | Facility: CLINIC | Age: 86
End: 2021-03-25

## 2021-03-25 VITALS
HEART RATE: 78 BPM | DIASTOLIC BLOOD PRESSURE: 74 MMHG | TEMPERATURE: 97.1 F | BODY MASS INDEX: 21.75 KG/M2 | HEIGHT: 72 IN | WEIGHT: 160.6 LBS | SYSTOLIC BLOOD PRESSURE: 134 MMHG | OXYGEN SATURATION: 98 %

## 2021-03-25 DIAGNOSIS — M79.89 ARM SWELLING: Primary | ICD-10-CM

## 2021-03-25 PROCEDURE — 99214 OFFICE O/P EST MOD 30 MIN: CPT | Performed by: SURGERY

## 2021-03-25 NOTE — PROGRESS NOTES
Patient: Jason Baugh    YOB: 1935    Date: 03/25/2021    Primary Care Provider: Ari Ricrado MD    Chief Complaint   Patient presents with   • Vascular Access Problem     malfunctioning av fistula        SUBJECTIVE:    History of present illness: Patient states that he has had dilation of his existing AV fistula.  This is worsening over the last year.  Also states that he has more swelling at times especially in the bicep area but better today.  No pain.  He had a fistula placed about 20 years ago and was on dialysis for perhaps 6 to 9 months.  Has not had dialysis since.    The following portions of the patient's history were reviewed and updated as appropriate: allergies, current medications, past family history, past medical history, past social history, past surgical history and problem list.      Review of Systems   Constitutional: Negative for chills, fever and unexpected weight change.   HENT: Negative for trouble swallowing and voice change.    Eyes: Negative for visual disturbance.   Respiratory: Negative for apnea, cough, chest tightness, shortness of breath and wheezing.    Cardiovascular: Negative for chest pain, palpitations and leg swelling.   Gastrointestinal: Negative for abdominal distention, abdominal pain, anal bleeding, blood in stool, constipation, diarrhea, nausea, rectal pain and vomiting.   Endocrine: Negative for cold intolerance and heat intolerance.   Genitourinary: Negative for difficulty urinating, dysuria, flank pain, scrotal swelling and testicular pain.   Musculoskeletal: Negative for back pain, gait problem and joint swelling.   Skin: Negative for color change, rash and wound.   Neurological: Negative for dizziness, syncope, speech difficulty, weakness, numbness and headaches.   Hematological: Negative for adenopathy. Does not bruise/bleed easily.   Psychiatric/Behavioral: Negative for confusion. The patient is not nervous/anxious.        Allergies:  No Known  Allergies    Medications:    Current Outpatient Medications:   •  amLODIPine (NORVASC) 10 MG tablet, Take 10 mg by mouth Daily., Disp: , Rfl:   •  bisacodyl (Dulcolax) 5 MG EC tablet, Clear liquid diet day prior to colonoscopy. 2 at 3pm and 2 at 7pm., Disp: 4 tablet, Rfl: 0  •  famotidine (PEPCID) 40 MG tablet, Take 1 tablet by mouth Daily., Disp: 30 tablet, Rfl: 0  •  levothyroxine (SYNTHROID, LEVOTHROID) 88 MCG tablet, Take 88 mcg by mouth Daily., Disp: , Rfl:   •  magnesium oxide (MAG-OX) 400 MG tablet, Take 1 tablet by mouth Daily., Disp: , Rfl:   •  polyethylene glycol (MIRALAX) 17 GM/SCOOP powder, Mix 238g powder with 64oz of clear liquids. Starting at 5pm drink 8oz every 10-15 minutes until consumed, Disp: 238 g, Rfl: 0  •  pravastatin (PRAVACHOL) 40 MG tablet, Take 40 mg by mouth Every Night., Disp: , Rfl:   •  sildenafil (REVATIO) 20 MG tablet, Take 1 to 3 tablets by mouth daily as needed for ED., Disp: 30 tablet, Rfl: 3  •  sodium bicarbonate 650 MG tablet, TAKE 1 TABLET BY MOUTH TWICE A DAY, Disp: , Rfl: 3  •  tamsulosin (FLOMAX) 0.4 MG capsule 24 hr capsule, Take 1 capsule by mouth Daily., Disp: 90 capsule, Rfl: 3  •  telmisartan (MICARDIS) 80 MG tablet, Take 80 mg by mouth Daily., Disp: , Rfl:     History:  Past Medical History:   Diagnosis Date   • Elevated PSA    • Hydronephrosis    • Hypertension    • Hypothyroid    • Kidney failure    • Urinary retention        Past Surgical History:   Procedure Laterality Date   • ARTERIOVENOUS FISTULA     • COLONOSCOPY     • COLONOSCOPY N/A 6/4/2020    Procedure: COLONOSCOPY;  Surgeon: Irene Art MD;  Location: [facility] ENDOSCOPY;  Service: Gastroenterology;  Laterality: N/A;   • CYSTOSCOPY TRANSURETHRAL RESECTION OF PROSTATE     • ENDOSCOPY     • ENDOSCOPY N/A 6/4/2020    Procedure: ESOPHAGOGASTRODUODENOSCOPY WITH BIOPSY;  Surgeon: Irene Art MD;  Location: [facility] ENDOSCOPY;  Service: Gastroenterology;  Laterality: N/A;       History reviewed. No  "pertinent family history.    Social History     Tobacco Use   • Smoking status: Never Smoker   • Smokeless tobacco: Never Used   Substance Use Topics   • Alcohol use: No   • Drug use: No        OBJECTIVE:    Vital Signs:   Vitals:    03/25/21 1255   BP: 134/74   Pulse: 78   Temp: 97.1 °F (36.2 °C)   SpO2: 98%   Weight: 72.8 kg (160 lb 9.6 oz)   Height: 182.9 cm (72\")       Physical Exam:   General Appearance:    Alert, cooperative, in no acute distress   Head:    Normocephalic, without obvious abnormality, atraumatic   Eyes:            Normal.  No scleral icterus.  PERRLA    Lungs:     Clear to auscultation,respirations regular, even and                  unlabored    Heart:    Regular rhythm and normal rate, normal S1 and S2, no            murmur   Extremities:  Patient with a right upper extremity AV fistula.  Excellent thrill throughout.  Main output is via the cephalic vein but also has multiple tributaries as well.  The cephalic vein in the upper arm measures up to an inch in size but rather consistent throughout.  No specific areas of aneurysmal dilation.  Excellent pulse in the right wrist   Skin:   No bleeding, bruising or rash   Neurologic:   Normal without gross deficits.   Psychiatric: No evidence of depression or anxiety        Results Review:   None    Review of Systems was reviewed and confirmed as accurate as documented by the MA.    ASSESSMENT/PLAN:    1. Arm swelling        Patient with a functioning AV fistula.  No areas of aneurysmal dilation.  This fistula has matured quite significantly over the years.  An option would be to consider ligation of this fistula.  I would like him to speak to his nephrologist first to see what their recommendations are especially with regard to his renal function.  Patient continues to have chronic renal insufficiency.  He will call me back or see me in follow-up if he wishes to have the fistula ligated.  Understands the procedure as well as the risk of bleeding and " infection.        Electronically signed by Christiano Narvaez MD  03/25/21

## 2023-01-23 ENCOUNTER — HOSPITAL ENCOUNTER (EMERGENCY)
Facility: HOSPITAL | Age: 88
Discharge: HOME | End: 2023-01-23
Attending: HOSPITALIST
Payer: COMMERCIAL

## 2023-01-23 ENCOUNTER — APPOINTMENT (OUTPATIENT)
Dept: GENERAL RADIOLOGY | Facility: HOSPITAL | Age: 88
End: 2023-01-23
Payer: COMMERCIAL

## 2023-01-23 VITALS
HEART RATE: 95 BPM | RESPIRATION RATE: 15 BRPM | OXYGEN SATURATION: 95 % | WEIGHT: 185 LBS | DIASTOLIC BLOOD PRESSURE: 70 MMHG | HEIGHT: 69 IN | SYSTOLIC BLOOD PRESSURE: 167 MMHG | TEMPERATURE: 97.6 F | BODY MASS INDEX: 27.4 KG/M2

## 2023-01-23 DIAGNOSIS — J18.9 PNEUMONIA OF BOTH LOWER LOBES DUE TO INFECTIOUS ORGANISM: Primary | ICD-10-CM

## 2023-01-23 DIAGNOSIS — E87.1 CHRONIC HYPONATREMIA: ICD-10-CM

## 2023-01-23 DIAGNOSIS — N18.9 CHRONIC RENAL FAILURE, UNSPECIFIED CKD STAGE: ICD-10-CM

## 2023-01-23 LAB
A/G RATIO: 0.8 (ref 0.8–2)
ALBUMIN SERPL-MCNC: 3.4 G/DL (ref 3.4–4.8)
ALP BLD-CCNC: 191 U/L (ref 25–100)
ALT SERPL-CCNC: 52 U/L (ref 4–36)
ANION GAP SERPL CALCULATED.3IONS-SCNC: 12 MMOL/L (ref 3–16)
AST SERPL-CCNC: 41 U/L (ref 8–33)
BASOPHILS ABSOLUTE: 0 K/UL (ref 0–0.1)
BASOPHILS RELATIVE PERCENT: 0.3 %
BILIRUB SERPL-MCNC: 0.7 MG/DL (ref 0.3–1.2)
BUN BLDV-MCNC: 56 MG/DL (ref 6–20)
CALCIUM SERPL-MCNC: 10.3 MG/DL (ref 8.5–10.5)
CHLORIDE BLD-SCNC: 93 MMOL/L (ref 98–107)
CO2: 25 MMOL/L (ref 20–30)
CREAT SERPL-MCNC: 2.6 MG/DL (ref 0.4–1.2)
EOSINOPHILS ABSOLUTE: 0.2 K/UL (ref 0–0.4)
EOSINOPHILS RELATIVE PERCENT: 1.1 %
GFR SERPL CREATININE-BSD FRML MDRD: 23 ML/MIN/{1.73_M2}
GLOBULIN: 4.5 G/DL
GLUCOSE BLD-MCNC: 118 MG/DL (ref 74–106)
HCT VFR BLD CALC: 36.4 % (ref 40–54)
HEMOGLOBIN: 11.6 G/DL (ref 13–18)
IMMATURE GRANULOCYTES #: 0.9 K/UL
IMMATURE GRANULOCYTES %: 6.5 % (ref 0–5)
LYMPHOCYTES ABSOLUTE: 1.3 K/UL (ref 1.5–4)
LYMPHOCYTES RELATIVE PERCENT: 9.7 %
MCH RBC QN AUTO: 28.5 PG (ref 27–32)
MCHC RBC AUTO-ENTMCNC: 31.9 G/DL (ref 31–35)
MCV RBC AUTO: 89.4 FL (ref 80–100)
MONOCYTES ABSOLUTE: 1.1 K/UL (ref 0.2–0.8)
MONOCYTES RELATIVE PERCENT: 8.3 %
NEUTROPHILS ABSOLUTE: 10.2 K/UL (ref 2–7.5)
NEUTROPHILS RELATIVE PERCENT: 74.1 %
PDW BLD-RTO: 14.2 % (ref 11–16)
PLATELET # BLD: 410 K/UL (ref 150–400)
PMV BLD AUTO: 9.4 FL (ref 6–10)
POTASSIUM REFLEX MAGNESIUM: 4.9 MMOL/L (ref 3.4–5.1)
RAPID INFLUENZA  B AGN: NEGATIVE
RAPID INFLUENZA A AGN: NEGATIVE
RBC # BLD: 4.07 M/UL (ref 4.5–6)
SARS-COV-2, NAAT: NOT DETECTED
SODIUM BLD-SCNC: 130 MMOL/L (ref 136–145)
TOTAL PROTEIN: 7.9 G/DL (ref 6.4–8.3)
TROPONIN: <0.3 NG/ML
WBC # BLD: 13.8 K/UL (ref 4–11)

## 2023-01-23 PROCEDURE — 36415 COLL VENOUS BLD VENIPUNCTURE: CPT

## 2023-01-23 PROCEDURE — 85025 COMPLETE CBC W/AUTO DIFF WBC: CPT

## 2023-01-23 PROCEDURE — 87804 INFLUENZA ASSAY W/OPTIC: CPT

## 2023-01-23 PROCEDURE — 84484 ASSAY OF TROPONIN QUANT: CPT

## 2023-01-23 PROCEDURE — 99285 EMERGENCY DEPT VISIT HI MDM: CPT

## 2023-01-23 PROCEDURE — 87635 SARS-COV-2 COVID-19 AMP PRB: CPT

## 2023-01-23 PROCEDURE — 80053 COMPREHEN METABOLIC PANEL: CPT

## 2023-01-23 PROCEDURE — 2580000003 HC RX 258: Performed by: HOSPITALIST

## 2023-01-23 PROCEDURE — 71045 X-RAY EXAM CHEST 1 VIEW: CPT

## 2023-01-23 RX ORDER — CHLORAL HYDRATE 500 MG
1 CAPSULE ORAL 2 TIMES DAILY
COMMUNITY

## 2023-01-23 RX ORDER — TELMISARTAN 80 MG/1
TABLET ORAL
COMMUNITY
Start: 2021-01-21

## 2023-01-23 RX ORDER — 0.9 % SODIUM CHLORIDE 0.9 %
1000 INTRAVENOUS SOLUTION INTRAVENOUS ONCE
Status: COMPLETED | OUTPATIENT
Start: 2023-01-23 | End: 2023-01-23

## 2023-01-23 RX ORDER — AMLODIPINE BESYLATE 10 MG/1
10 TABLET ORAL DAILY
COMMUNITY
Start: 2016-07-09

## 2023-01-23 RX ORDER — FLUTICASONE PROPIONATE 50 MCG
SPRAY, SUSPENSION (ML) NASAL
COMMUNITY
Start: 2022-08-09

## 2023-01-23 RX ORDER — CEFDINIR 300 MG/1
300 CAPSULE ORAL DAILY
Qty: 10 CAPSULE | Refills: 0 | Status: SHIPPED | OUTPATIENT
Start: 2023-01-23 | End: 2023-02-02

## 2023-01-23 RX ORDER — LEVOTHYROXINE SODIUM 88 UG/1
88 TABLET ORAL DAILY
COMMUNITY
Start: 2016-07-09

## 2023-01-23 RX ORDER — ALLOPURINOL 100 MG/1
100 TABLET ORAL DAILY
COMMUNITY
Start: 2022-12-08 | End: 2023-12-08

## 2023-01-23 RX ADMIN — SODIUM CHLORIDE 1000 ML: 9 INJECTION, SOLUTION INTRAVENOUS at 17:07

## 2023-01-23 ASSESSMENT — PAIN SCALES - GENERAL: PAINLEVEL_OUTOF10: 0

## 2023-01-23 ASSESSMENT — PAIN - FUNCTIONAL ASSESSMENT: PAIN_FUNCTIONAL_ASSESSMENT: NONE - DENIES PAIN

## 2023-01-23 NOTE — ED PROVIDER NOTES
[address] [facility] ENCOUNTER      Pt Name: Cherie Pisano  MRN: [id number]  YOB: 1935  Date of evaluation: 1/23/2023  Provider: Jo Ann Mcadams [id number]       Chief Complaint   Patient presents with    Fatigue         HISTORY OF PRESENT ILLNESS  (Location/Symptom, Timing/Onset, Context/Setting, Quality, Duration, Modifying Factors, Severity.)   Cherie Pisano is a 80 y.o. male who presents to the emergency department for fatigue and cough. Patient states he just has not been feeling very well for the past week. He denies any headaches at the ordinary. Denies any sore throat or earache. Denies any loss of taste or smell. Denies any chest pain or shortness of breath. Denies any nausea vomiting or diarrhea. States he does have a cough which is just dry nonproductive. Denies any abdominal pain out of ordinary. Denies any dysuria. Denies any body aches out of the ordinary. Denies any numbness tingling weakness of the extremities. Patient states he has just been sort of fatigued and tired for the past week along with the cough. States he has had decreased oral intake just because he is not had much of an appetite. He states has been trying to make himself eat but is difficult to do so when he does not have an appetite. Denies any sick contact. Patient is vaccinated for COVID with 2 injections and a booster. He also got influenza vaccination. Nursing notes were reviewed.     REVIEW OFSYSTEMS    (2-9 systems for level 4, 10 or more for level 5)   ROS:  General:  No fevers, no chills, +weakness/fatigue  Cardiovascular:  No chest pain, no palpitations  Respiratory:  No shortness of breath, + cough, no wheezing  Gastrointestinal:  No pain, no nausea, no vomiting, no diarrhea  Musculoskeletal:  No muscle pain, no joint pain  Skin:  No rash, no easy bruising  Neurologic:  No speech problems, no headache, no extremity weakness  Psychiatric:  No anxiety  Genitourinary:  No dysuria, no hematuria    Except as noted above the remainder of the review of systems was reviewed and negative. PAST MEDICAL HISTORY     Past Medical History:   Diagnosis Date    Chronic kidney disease     Dialysis patient ([address] 1998    Gout     Hyperlipidemia     Hypertension     Kidney failure          SURGICAL HISTORY       Past Surgical History:   Procedure Laterality Date    AV FISTULA PLACEMENT Right 1998         CURRENT MEDICATIONS       Previous Medications    ALLOPURINOL (ZYLOPRIM) 100 MG TABLET    Take 100 mg by mouth daily    AMLODIPINE (NORVASC) 10 MG TABLET    Take 10 mg by mouth daily    FLUTICASONE (FLONASE) 50 MCG/ACT NASAL SPRAY    INSTILL ONE SPRAY NASALLY AS NEEDED    LEVOTHYROXINE (SYNTHROID) 88 MCG TABLET    Take 88 mcg by mouth daily    OMEGA-3 FATTY ACIDS (FISH OIL) 1000 MG CAPSULE    Take 1 capsule by mouth 2 times daily    TELMISARTAN (MICARDIS) 80 MG TABLET    TAKE 1 TABLET BY MOUTH EVERY DAY       ALLERGIES     Patient has no known allergies. FAMILY HISTORY     History reviewed. No pertinent family history.        SOCIAL HISTORY       Social History     Socioeconomic History    Marital status: Single     Spouse name: None    Number of children: None    Years of education: None    Highest education level: None   Tobacco Use    Smoking status: Never    Smokeless tobacco: Never   Substance and Sexual Activity    Alcohol use: Never    Drug use: Never    Sexual activity: Yes     Partners: Female         PHYSICAL EXAM    (up to 7 for level 4, 8 or more for level 5)     ED Triage Vitals   BP Temp Temp src Heart Rate Resp SpO2 Height Weight   01/23/23 1610 01/23/23 1610 -- 01/23/23 1610 01/23/23 1615 01/23/23 1610 01/23/23 1610 01/23/23 1610   (!) 173/73 97.6 °F (36.4 °C)  94 15 95 % 5' 9\" (1.753 m) 185 lb (83.9 kg)       Physical Exam  General :Patient is awake, alert, oriented, in no acute distress, nontoxic appearing  HEENT: Pupils are equally round and reactive to light, EOMI, conjunctivae clear. Oral mucosa is moist, no exudate. Uvula is midline. Cardiac: Heart regular rate, rhythm, 2/6 systolic murmur best heard over the left medial sternal border chronic per patient, rubs, or gallops  Lungs: Lungs are clear to auscultation, there is no wheezing, rhonchi, or rales. There is no use of accessory muscles. Abdomen: Abdomen is soft, nontender, nondistended. There is no firm or pulsatile masses, no rebound rigidity or guarding. Musculoskeletal: 5 out of 5 strength in all 4 extremities. No focal muscle deficits are appreciated, the fistula noted to the right arm with good palpable thrill  Neuro: Motor intact, sensory intact, level of consciousness is normal  Dermatology: Skin is warm and dry  Psych: Mentation is grossly normal, cognition is grossly normal. Affect is appropriate. DIAGNOSTIC RESULTS     EKG: All EKG's are interpreted by the Emergency Department Physician who either signs or Co-signs this chart in the [address] a cardiologist.    The EKG interpreted by me shows sinus rhythm. Heart rate is 90 bpm, OH interval is 186 ms, QRS durations 85, QT is 359 and QTC is 439 ms. No acute ST elevations concerning for acute myocardial infarction. No ST depressions concerning for acute myocardial ischemia. RADIOLOGY:   Non-plain film images such as CT, Ultrasound and MRI are read by the radiologist. Plain radiographic images are visualized and preliminarily interpreted by the emergency physician with the below findings:      [] Radiologist's Report Reviewed:  XR CHEST PORTABLE   Final Result      Moderate bibasal airspace disease suspicious for multifocal pneumonia.             ED BEDSIDE ULTRASOUND:   Performed by ED Physician - none    LABS:    I have reviewed and interpreted all of the currently available lab results from this visit (ifapplicable):  Results for orders placed or performed during the hospital encounter of 01/23/23   COVID-19, Rapid Specimen: Nasopharyngeal Swab   Result Value Ref Range    SARS-CoV-2, NAAT Not Detected Not Detected   Rapid Influenza A/B Antigens    Specimen: Nasopharyngeal   Result Value Ref Range    Rapid Influenza A Ag Negative Negative    Rapid Influenza B Ag Negative Negative   CBC with Auto Differential   Result Value Ref Range    WBC 13.8 (H) 4.0 - 11.0 K/uL    RBC 4.07 (L) 4.50 - 6.00 M/uL    Hemoglobin 11.6 (L) 13.0 - 18.0 g/dL    Hematocrit 36.4 (L) 40.0 - 54.0 %    MCV 89.4 80.0 - 100.0 fL    MCH 28.5 27.0 - 32.0 pg    MCHC 31.9 31.0 - 35.0 g/dL    RDW 14.2 11.0 - 16.0 %    Platelets 935 (H) 655 - 400 K/uL    MPV 9.4 6.0 - 10.0 fL    Neutrophils % 74.1 %    Immature Granulocytes % 6.5 (H) 0.0 - 5.0 %    Lymphocytes % 9.7 %    Monocytes % 8.3 %    Eosinophils % 1.1 %    Basophils % 0.3 %    Neutrophils Absolute 10.2 (H) 2.0 - 7.5 K/uL    Immature Granulocytes # 0.9 K/uL    Lymphocytes Absolute 1.3 (L) 1.5 - 4.0 K/uL    Monocytes Absolute 1.1 (H) 0.2 - 0.8 K/uL    Eosinophils Absolute 0.2 0.0 - 0.4 K/uL    Basophils Absolute 0.0 0.0 - 0.1 K/uL   Comprehensive Metabolic Panel w/ Reflex to MG   Result Value Ref Range    Sodium 130 (L) 136 - 145 mmol/L    Potassium reflex Magnesium 4.9 3.4 - 5.1 mmol/L    Chloride 93 (L) 98 - 107 mmol/L    CO2 25 20 - 30 mmol/L    Anion Gap 12 3 - 16    Glucose 118 (H) 74 - 106 mg/dL    BUN 56 (H) 6 - 20 mg/dL    Creatinine 2.6 (H) 0.4 - 1.2 mg/dL    Est, Glom Filt Rate 23 (L) >59    Calcium 10.3 8.5 - 10.5 mg/dL    Total Protein 7.9 6.4 - 8.3 g/dL    Albumin 3.4 3.4 - 4.8 g/dL    Albumin/Globulin Ratio 0.8 0.8 - 2.0    Total Bilirubin 0.7 0.3 - 1.2 mg/dL    Alkaline Phosphatase 191 (H) 25 - 100 U/L    ALT 52 (H) 4 - 36 U/L    AST 41 (H) 8 - 33 U/L    Globulin 4.5 Not Established g/dL   Troponin   Result Value Ref Range    Troponin <0.30 <0.30 ng/mL        All other labs were within normal range or not returned as of this dictation.     EMERGENCY DEPARTMENT COURSE and DIFFERENTIAL DIAGNOSIS/MDM:   Vitals:    Vitals:    01/23/23 1610 01/23/23 1615   BP: (!) 173/73 (!) 167/70   Pulse: 94 95   Resp:  15   Temp: 97.6 °F (36.4 °C)    SpO2: 95%    Weight: 185 lb (83.9 kg)    Height: 5' 9\" (1.753 m)        MEDICATIONS ADMINISTERED IN ED:  Medications   0.9 % sodium chloride bolus (1,000 mLs IntraVENous New Bag 1/23/23 1707)       After initial evaluation examination I did have conversation with the patient about upcoming plan, treatment possible disposition which they are agreeable to the times dictation. Patient advised we going check for rapid COVID and influenza swab. He states he did have a COVID swab at home which was negative but advised that we would still perform a test here for rule out. Patient symptoms have been persistent for about a week so he should be able to be sufficiently ruled out with the test here if negative. Advised we also check a portable chest radiograph just to make sure there is no acute pneumonic type process causing the patient's fatigue, weakness in his cough even though its been dry and no fever. Patient will have CBC, CMP, troponin and a twelve-lead EKG performed. Patient's final disposition return once his radiological diagnostic studies been performed reviewed. Otherwise he is resting comfortably stretcher no acute distress nontoxic-appearing. Patient is agreeable to this plan and work-up. Rapid COVID screen was negative    Rapid influenza screen was negative    Blood work showed comprehensive metabolic panel was benign except for sodium 130 slightly low, chloride of 93 slightly low, glucose of 118, BUN of 56 and a creatinine of 2.6. Alkaline phosphatase is 191. ALT was 52 and AST was 41. White count 13,800, hemoglobin is 11.6, hematocrit 36.4, platelet count 035.     Portable chest radiograph read by radiology as moderate bibasilar airspace disease suspicious for multifocal pneumonia    Patient's radiological diagnostic studies were discussed with him and he does state his understanding. Patient advised that his hyponatremia is chronic he is on multiple sodium tablets at home. He was given a small fluid bolus here just because of the low sodium and his low chloride. However he is not hypoxic he is not tachycardic he has no complaints he actually looks very well. Patient really does not meet criteria for hospitalization at this time for his pneumonia but we will cover him with antibiotic. Will place the patient on Omnicef 1 tablet twice a day for 10 days. Otherwise COVID and flu was negative. Patient be discharged home in stable condition. Discussed with the patient he is agreeable to be discharged. The patient advised they do need to follow-up with a regular family physician within the next 1 to 2 days for evaluation. They are also given instructions of the symptoms worsens or new symptoms arise they should return back to emergency department for further evaluation work-up. Is this patient to be included in the SEP-1 Core Measure due to severe sepsis or septic shock? No   Exclusion criteria - the patient is NOT to be included for SEP-1 Core Measure due to:  2+ SIRS criteria are not met          CONSULTS:  None    PROCEDURES:  Procedures    CRITICAL CARE TIME    Total Critical Care time was 0 minutes, excluding separately reportable procedures. There was a high probability of clinically significant/life threatening deterioration in the patient's condition which required my urgent intervention. FINAL IMPRESSION      1. Pneumonia of both lower lobes due to infectious organism    2. Chronic hyponatremia    3. Chronic renal failure, unspecified CKD stage          DISPOSITION/PLAN   DISPOSITION        PATIENT REFERRED TO:  Chato Newby MD  [address] [address]  [phone]    In 2 days      [facility] Emergency Department  [facility] [address]   [facility]  [phone]    As needed, If symptoms worsen    DISCHARGE MEDICATIONS:  New Prescriptions    CEFDINIR (OMNICEF) 300 MG CAPSULE    Take 1 capsule by mouth daily for 10 days       Comment: Please note this report has been produced using speech recognition software and may contain errorsrelated to that system including errors in grammar, punctuation, and spelling, as well as words and phrases that may be inappropriate. If there are any questions or concerns please feel free to contact the dictating providerfor clarification.     Cely Stauffer DO  Attending Emergency Physician               Cely Stauffer DO  01/23/23 8725

## 2023-08-24 ENCOUNTER — HOSPITAL ENCOUNTER (OUTPATIENT)
Facility: HOSPITAL | Age: 88
Discharge: HOME | End: 2023-08-24
Payer: COMMERCIAL

## 2023-08-24 PROCEDURE — 87086 URINE CULTURE/COLONY COUNT: CPT

## 2023-08-26 LAB — BACTERIA UR CULT: NORMAL

## 2023-09-13 ENCOUNTER — HOSPITAL ENCOUNTER (OUTPATIENT)
Facility: HOSPITAL | Age: 88
Discharge: HOME | End: 2023-09-13
Payer: COMMERCIAL

## 2023-09-13 PROCEDURE — 87086 URINE CULTURE/COLONY COUNT: CPT

## 2023-09-15 LAB — BACTERIA UR CULT: NORMAL

## 2023-09-26 ENCOUNTER — OFFICE VISIT (OUTPATIENT)
Dept: UROLOGY | Facility: CLINIC | Age: 88
End: 2023-09-26
Payer: MEDICARE

## 2023-09-26 VITALS
HEIGHT: 72 IN | DIASTOLIC BLOOD PRESSURE: 82 MMHG | BODY MASS INDEX: 23.3 KG/M2 | OXYGEN SATURATION: 93 % | SYSTOLIC BLOOD PRESSURE: 170 MMHG | HEART RATE: 131 BPM | TEMPERATURE: 97.9 F | WEIGHT: 172 LBS

## 2023-09-26 DIAGNOSIS — R31.29 MICROSCOPIC HEMATURIA: ICD-10-CM

## 2023-09-26 DIAGNOSIS — N52.01 ERECTILE DYSFUNCTION DUE TO ARTERIAL INSUFFICIENCY: ICD-10-CM

## 2023-09-26 DIAGNOSIS — R39.9 UTI SYMPTOMS: Primary | ICD-10-CM

## 2023-09-26 LAB
BILIRUB BLD-MCNC: NEGATIVE MG/DL
CLARITY, POC: CLEAR
COLOR UR: YELLOW
EXPIRATION DATE: ABNORMAL
GLUCOSE UR STRIP-MCNC: NEGATIVE MG/DL
KETONES UR QL: NEGATIVE
LEUKOCYTE EST, POC: NEGATIVE
Lab: ABNORMAL
NITRITE UR-MCNC: NEGATIVE MG/ML
PH UR: 7 [PH] (ref 5–8)
PROT UR STRIP-MCNC: ABNORMAL MG/DL
RBC # UR STRIP: ABNORMAL /UL
SP GR UR: 1.01 (ref 1–1.03)
UROBILINOGEN UR QL: NORMAL

## 2023-09-26 RX ORDER — SILDENAFIL CITRATE 20 MG/1
TABLET ORAL
Qty: 30 TABLET | Refills: 3 | Status: SHIPPED | OUTPATIENT
Start: 2023-09-26

## 2023-09-26 NOTE — PROGRESS NOTES
Office Visit Established Male Patient     Patient Name: Jason Baugh  : 1935   MRN: [id number]     Chief Complaint:   Chief Complaint   Patient presents with    Urinary Urgency     Urgency, pressure, discomfort urinating       History of Present Illness: Mr. Jason Baugh is a 88 y.o. male who presents today for recent UTI, history of TURP with Dr. Alvarez several years ago and no urinary symptoms since.  He occasionally uses sildenafil and would like his prescription renewed   Today he does not have any Uti symptoms   He reports this is only the second UTI he has had in his life      Subjective      Review of System:   As noted in HPI    Past Medical History:   Past Medical History:   Diagnosis Date    Elevated PSA     Hydronephrosis     Hypertension     Hypothyroid     Kidney failure     Urinary retention        Past Surgical History:   Past Surgical History:   Procedure Laterality Date    ARTERIOVENOUS FISTULA      COLONOSCOPY      COLONOSCOPY N/A 2020    Procedure: COLONOSCOPY;  Surgeon: Irene Art MD;  Location: [facility] ENDOSCOPY;  Service: Gastroenterology;  Laterality: N/A;    CYSTOSCOPY TRANSURETHRAL RESECTION OF PROSTATE      ENDOSCOPY      ENDOSCOPY N/A 2020    Procedure: ESOPHAGOGASTRODUODENOSCOPY WITH BIOPSY;  Surgeon: Irene Art MD;  Location: [facility] ENDOSCOPY;  Service: Gastroenterology;  Laterality: N/A;       Family History: History reviewed. No pertinent family history.    Social History:   Social History     Socioeconomic History    Marital status:    Tobacco Use    Smoking status: Never    Smokeless tobacco: Never   Substance and Sexual Activity    Alcohol use: No    Drug use: No    Sexual activity: Defer       Medications:     Current Outpatient Medications:     levothyroxine (SYNTHROID, LEVOTHROID) 88 MCG tablet, Take 1 tablet by mouth Daily., Disp: , Rfl:     pravastatin (PRAVACHOL) 40 MG tablet, Take 1 tablet by mouth Every Night., Disp: , Rfl:     sildenafil  "(REVATIO) 20 MG tablet, Take 1 to 3 tablets by mouth daily as needed for ED., Disp: 30 tablet, Rfl: 3    sodium bicarbonate 650 MG tablet, TAKE 1 TABLET BY MOUTH TWICE A DAY, Disp: , Rfl: 3    amLODIPine (NORVASC) 10 MG tablet, Take 10 mg by mouth Daily. (Patient not taking: Reported on 9/26/2023), Disp: , Rfl:     bisacodyl (Dulcolax) 5 MG EC tablet, Clear liquid diet day prior to colonoscopy. 2 at 3pm and 2 at 7pm. (Patient not taking: Reported on 9/26/2023), Disp: 4 tablet, Rfl: 0    famotidine (PEPCID) 40 MG tablet, Take 1 tablet by mouth Daily. (Patient not taking: Reported on 9/26/2023), Disp: 30 tablet, Rfl: 0    magnesium oxide (MAG-OX) 400 MG tablet, Take 1 tablet by mouth Daily. (Patient not taking: Reported on 9/26/2023), Disp: , Rfl:     polyethylene glycol (MIRALAX) 17 GM/SCOOP powder, Mix 238g powder with 64oz of clear liquids. Starting at 5pm drink 8oz every 10-15 minutes until consumed (Patient not taking: Reported on 9/26/2023), Disp: 238 g, Rfl: 0    telmisartan (MICARDIS) 80 MG tablet, Take 80 mg by mouth Daily. (Patient not taking: Reported on 9/26/2023), Disp: , Rfl:     Allergies:   No Known Allergies    Objective     Physical Exam:   Vital Signs:   Vitals:    09/26/23 1431   BP: 170/82   BP Location: Left arm   Patient Position: Sitting   Cuff Size: Adult   Pulse: (!) 131   Temp: 97.9 °F (36.6 °C)   TempSrc: Temporal   SpO2: 93%   Weight: 78 kg (172 lb)   Height: 182.9 cm (72\")     Body mass index is 23.33 kg/m².     Physical Exam  Constitutional: NAD, WDWN.   Neurological: A + O x 3.   Psychiatric:  Normal mood and affect    Labs  Brief Urine Lab Results  (Last result in the past 365 days)        Color   Clarity   Blood   Leuk Est   Nitrite   Protein   CREAT   Urine HCG        09/26/23 1448 Yellow   Clear   Trace   Negative   Negative   2+                   Lab Results   Component Value Date    GLUCOSE 105 (H) 06/04/2020    CALCIUM 11.3 (H) 06/04/2020     (L) 06/04/2020    K 4.8 " 06/04/2020    CO2 14.6 (L) 06/04/2020     06/04/2020    BUN 53 (H) 06/04/2020    CREATININE 3.09 (H) 06/04/2020    EGFRIFNONA 19 (L) 06/04/2020    BCR 17.2 06/04/2020    ANIONGAP 16.4 (H) 06/04/2020       Lab Results   Component Value Date    WBC 13.8 (H) 01/23/2023    HGB 11.6 (L) 01/23/2023    HCT 36.4 (L) 01/23/2023    MCV 89.4 01/23/2023     (H) 01/23/2023            Radiographic Studies  No Images in the past 120 days found..    I have reviewed the above labs and imaging.     Assessment / Plan      Assessment/Plan:   Diagnoses and all orders for this visit:    1. UTI symptoms (Primary)  -     POC Urinalysis Dipstick, Automated    2. Microscopic hematuria  -     Urinalysis With Microscopic - Urine, Clean Catch    3. Erectile dysfunction due to arterial insufficiency  -     sildenafil (REVATIO) 20 MG tablet; Take 1 to 3 tablets by mouth daily as needed for ED.  Dispense: 30 tablet; Refill: 3    88-year-old male with history of recent UTI is completely asymptomatic today UA has 2+ protein and trace blood.  He is followed by Dr. Benjamin in nephrology for chronic kidney disease.  Check urine microscopy to rule out microscopic hematuria.  Today he does not complain of any BPH symptoms says he has not had no symptoms since he had his TURP with Dr. Alvarez several years ago he would like a refill on his ED medication he uses sildenafil as needed for years.    Sildenafil refilled for patient    Follow Up:   Return if symptoms worsen or fail to improve.    Hola Xiong, APRN,NP-C  [facility] [place]

## 2023-09-27 LAB
APPEARANCE UR: CLEAR
BACTERIA #/AREA URNS HPF: NORMAL /HPF
BILIRUB UR QL STRIP: NEGATIVE
CASTS URNS MICRO: NORMAL
COLOR UR: YELLOW
EPI CELLS #/AREA URNS HPF: NORMAL /HPF
GLUCOSE UR QL STRIP: NEGATIVE
HGB UR QL STRIP: NEGATIVE
KETONES UR QL STRIP: NEGATIVE
LEUKOCYTE ESTERASE UR QL STRIP: NEGATIVE
NITRITE UR QL STRIP: NEGATIVE
PH UR STRIP: 7 [PH] (ref 5–8)
PROT UR QL STRIP: ABNORMAL
RBC #/AREA URNS HPF: NORMAL /HPF
SP GR UR STRIP: 1.01 (ref 1–1.03)
UROBILINOGEN UR STRIP-MCNC: ABNORMAL MG/DL
WBC #/AREA URNS HPF: NORMAL /HPF

## 2023-11-26 ENCOUNTER — HOSPITAL ENCOUNTER (EMERGENCY)
Facility: HOSPITAL | Age: 88
Discharge: HOME | End: 2023-11-26
Attending: EMERGENCY MEDICINE
Payer: COMMERCIAL

## 2023-11-26 VITALS
HEIGHT: 72 IN | SYSTOLIC BLOOD PRESSURE: 157 MMHG | HEART RATE: 93 BPM | DIASTOLIC BLOOD PRESSURE: 58 MMHG | BODY MASS INDEX: 24.38 KG/M2 | OXYGEN SATURATION: 99 % | RESPIRATION RATE: 16 BRPM | TEMPERATURE: 97.9 F | WEIGHT: 180 LBS

## 2023-11-26 DIAGNOSIS — R31.9 URINARY TRACT INFECTION WITH HEMATURIA, SITE UNSPECIFIED: Primary | ICD-10-CM

## 2023-11-26 DIAGNOSIS — N39.0 URINARY TRACT INFECTION WITH HEMATURIA, SITE UNSPECIFIED: Primary | ICD-10-CM

## 2023-11-26 DIAGNOSIS — M54.50 ACUTE LEFT-SIDED LOW BACK PAIN WITHOUT SCIATICA: ICD-10-CM

## 2023-11-26 LAB
BACTERIA URNS QL MICRO: ABNORMAL /HPF
BILIRUB UR QL STRIP.AUTO: NEGATIVE
CLARITY UR: CLEAR
COLOR UR: YELLOW
GLUCOSE UR STRIP.AUTO-MCNC: NEGATIVE MG/DL
HGB UR QL STRIP.AUTO: ABNORMAL
KETONES UR STRIP.AUTO-MCNC: NEGATIVE MG/DL
LEUKOCYTE ESTERASE UR QL STRIP.AUTO: NEGATIVE
NITRITE UR QL STRIP.AUTO: NEGATIVE
PH UR STRIP.AUTO: 7 [PH] (ref 5–8)
PROT UR STRIP.AUTO-MCNC: >=300 MG/DL
RBC #/AREA URNS HPF: ABNORMAL /HPF (ref 0–4)
SP GR UR STRIP.AUTO: 1.02 (ref 1–1.03)
UA COMPLETE W REFLEX CULTURE PNL UR: ABNORMAL
UA DIPSTICK W REFLEX MICRO PNL UR: YES
URN SPEC COLLECT METH UR: ABNORMAL
UROBILINOGEN UR STRIP-ACNC: 0.2 E.U./DL
WBC #/AREA URNS HPF: ABNORMAL /HPF (ref 0–5)

## 2023-11-26 PROCEDURE — 81001 URINALYSIS AUTO W/SCOPE: CPT

## 2023-11-26 PROCEDURE — 6370000000 HC RX 637 (ALT 250 FOR IP): Performed by: EMERGENCY MEDICINE

## 2023-11-26 PROCEDURE — 99283 EMERGENCY DEPT VISIT LOW MDM: CPT

## 2023-11-26 RX ORDER — HYDROCODONE BITARTRATE AND ACETAMINOPHEN 5; 325 MG/1; MG/1
1 TABLET ORAL ONCE
Status: COMPLETED | OUTPATIENT
Start: 2023-11-26 | End: 2023-11-26

## 2023-11-26 RX ORDER — CEPHALEXIN 500 MG/1
500 CAPSULE ORAL ONCE
Status: COMPLETED | OUTPATIENT
Start: 2023-11-26 | End: 2023-11-26

## 2023-11-26 RX ORDER — CEPHALEXIN 500 MG/1
500 CAPSULE ORAL 3 TIMES DAILY
Qty: 21 CAPSULE | Refills: 0 | Status: SHIPPED | OUTPATIENT
Start: 2023-11-26 | End: 2023-12-03

## 2023-11-26 RX ORDER — LIDOCAINE 50 MG/G
1 PATCH TOPICAL DAILY
Qty: 10 PATCH | Refills: 0 | Status: SHIPPED | OUTPATIENT
Start: 2023-11-26 | End: 2023-12-06

## 2023-11-26 RX ORDER — LIDOCAINE 4 G/G
1 PATCH TOPICAL ONCE
Status: DISCONTINUED | OUTPATIENT
Start: 2023-11-26 | End: 2023-11-26 | Stop reason: HOSPADM

## 2023-11-26 RX ADMIN — CEPHALEXIN 500 MG: 500 CAPSULE ORAL at 19:48

## 2023-11-26 RX ADMIN — HYDROCODONE BITARTRATE AND ACETAMINOPHEN 1 TABLET: 5; 325 TABLET ORAL at 19:48

## 2023-11-26 ASSESSMENT — ENCOUNTER SYMPTOMS
BACK PAIN: 1
DIARRHEA: 0
SHORTNESS OF BREATH: 0
COUGH: 0
EYE PAIN: 0
ABDOMINAL PAIN: 0
EYE REDNESS: 0
NAUSEA: 0
RHINORRHEA: 0
CONSTIPATION: 0
VOMITING: 0

## 2023-11-26 NOTE — ED TRIAGE NOTES
Pt states that he has been having low back pain. Patient also complaining of frequent urination and dyuria. Pt states that a few weeks ago he had a UTI and feels as if it has came back.

## 2023-11-27 ENCOUNTER — HOSPITAL ENCOUNTER (EMERGENCY)
Facility: HOSPITAL | Age: 88
Discharge: HOME | End: 2023-11-27
Attending: EMERGENCY MEDICINE | Admitting: EMERGENCY MEDICINE
Payer: MEDICARE

## 2023-11-27 ENCOUNTER — TELEPHONE (OUTPATIENT)
Dept: SURGERY | Facility: OTHER | Age: 88
End: 2023-11-27

## 2023-11-27 ENCOUNTER — APPOINTMENT (OUTPATIENT)
Dept: CT IMAGING | Facility: HOSPITAL | Age: 88
End: 2023-11-27
Payer: MEDICARE

## 2023-11-27 VITALS
HEIGHT: 72 IN | BODY MASS INDEX: 24.38 KG/M2 | HEART RATE: 85 BPM | WEIGHT: 180 LBS | DIASTOLIC BLOOD PRESSURE: 71 MMHG | TEMPERATURE: 97.5 F | SYSTOLIC BLOOD PRESSURE: 164 MMHG | OXYGEN SATURATION: 96 % | RESPIRATION RATE: 20 BRPM

## 2023-11-27 DIAGNOSIS — M54.50 LOW BACK PAIN, UNSPECIFIED BACK PAIN LATERALITY, UNSPECIFIED CHRONICITY, UNSPECIFIED WHETHER SCIATICA PRESENT: Primary | ICD-10-CM

## 2023-11-27 LAB
ALBUMIN SERPL-MCNC: 3.8 G/DL (ref 3.5–5.2)
ALBUMIN/GLOB SERPL: 1.1 G/DL
ALP SERPL-CCNC: 151 U/L (ref 39–117)
ALT SERPL W P-5'-P-CCNC: 9 U/L (ref 1–41)
ANION GAP SERPL CALCULATED.3IONS-SCNC: 12.6 MMOL/L (ref 5–15)
AST SERPL-CCNC: 14 U/L (ref 1–40)
BACTERIA UR QL AUTO: NORMAL /HPF
BASOPHILS # BLD AUTO: 0.03 10*3/MM3 (ref 0–0.2)
BASOPHILS NFR BLD AUTO: 0.5 % (ref 0–1.5)
BILIRUB SERPL-MCNC: 0.5 MG/DL (ref 0–1.2)
BILIRUB UR QL STRIP: NEGATIVE
BUN SERPL-MCNC: 48 MG/DL (ref 8–23)
BUN/CREAT SERPL: 18.5 (ref 7–25)
CALCIUM SPEC-SCNC: 10.4 MG/DL (ref 8.6–10.5)
CHLORIDE SERPL-SCNC: 102 MMOL/L (ref 98–107)
CLARITY UR: CLEAR
CO2 SERPL-SCNC: 20.4 MMOL/L (ref 22–29)
COLOR UR: YELLOW
CREAT SERPL-MCNC: 2.59 MG/DL (ref 0.76–1.27)
DEPRECATED RDW RBC AUTO: 47.7 FL (ref 37–54)
EGFRCR SERPLBLD CKD-EPI 2021: 23.1 ML/MIN/1.73
EOSINOPHIL # BLD AUTO: 0.23 10*3/MM3 (ref 0–0.4)
EOSINOPHIL NFR BLD AUTO: 3.5 % (ref 0.3–6.2)
ERYTHROCYTE [DISTWIDTH] IN BLOOD BY AUTOMATED COUNT: 13.3 % (ref 12.3–15.4)
GLOBULIN UR ELPH-MCNC: 3.5 GM/DL
GLUCOSE SERPL-MCNC: 90 MG/DL (ref 65–99)
GLUCOSE UR STRIP-MCNC: NEGATIVE MG/DL
HCT VFR BLD AUTO: 38.5 % (ref 37.5–51)
HGB BLD-MCNC: 12.1 G/DL (ref 13–17.7)
HGB UR QL STRIP.AUTO: NEGATIVE
HOLD SPECIMEN: NORMAL
HYALINE CASTS UR QL AUTO: NORMAL /LPF
IMM GRANULOCYTES # BLD AUTO: 0.03 10*3/MM3 (ref 0–0.05)
IMM GRANULOCYTES NFR BLD AUTO: 0.5 % (ref 0–0.5)
KETONES UR QL STRIP: NEGATIVE
LEUKOCYTE ESTERASE UR QL STRIP.AUTO: NEGATIVE
LIPASE SERPL-CCNC: 87 U/L (ref 13–60)
LYMPHOCYTES # BLD AUTO: 1.21 10*3/MM3 (ref 0.7–3.1)
LYMPHOCYTES NFR BLD AUTO: 18.3 % (ref 19.6–45.3)
MCH RBC QN AUTO: 30.2 PG (ref 26.6–33)
MCHC RBC AUTO-ENTMCNC: 31.4 G/DL (ref 31.5–35.7)
MCV RBC AUTO: 96 FL (ref 79–97)
MONOCYTES # BLD AUTO: 0.78 10*3/MM3 (ref 0.1–0.9)
MONOCYTES NFR BLD AUTO: 11.8 % (ref 5–12)
NEUTROPHILS NFR BLD AUTO: 4.34 10*3/MM3 (ref 1.7–7)
NEUTROPHILS NFR BLD AUTO: 65.4 % (ref 42.7–76)
NITRITE UR QL STRIP: NEGATIVE
NRBC BLD AUTO-RTO: 0 /100 WBC (ref 0–0.2)
PH UR STRIP.AUTO: 7 [PH] (ref 5–8)
PLATELET # BLD AUTO: 182 10*3/MM3 (ref 140–450)
PMV BLD AUTO: 10.6 FL (ref 6–12)
POTASSIUM SERPL-SCNC: 5 MMOL/L (ref 3.5–5.2)
PROT SERPL-MCNC: 7.3 G/DL (ref 6–8.5)
PROT UR QL STRIP: ABNORMAL
RBC # BLD AUTO: 4.01 10*6/MM3 (ref 4.14–5.8)
RBC # UR STRIP: NORMAL /HPF
REF LAB TEST METHOD: NORMAL
SODIUM SERPL-SCNC: 135 MMOL/L (ref 136–145)
SP GR UR STRIP: 1.01 (ref 1–1.03)
SQUAMOUS #/AREA URNS HPF: NORMAL /HPF
UROBILINOGEN UR QL STRIP: ABNORMAL
WBC # UR STRIP: NORMAL /HPF
WBC NRBC COR # BLD AUTO: 6.62 10*3/MM3 (ref 3.4–10.8)
WHOLE BLOOD HOLD SPECIMEN: NORMAL

## 2023-11-27 PROCEDURE — 74176 CT ABD & PELVIS W/O CONTRAST: CPT

## 2023-11-27 PROCEDURE — 36415 COLL VENOUS BLD VENIPUNCTURE: CPT

## 2023-11-27 PROCEDURE — 80053 COMPREHEN METABOLIC PANEL: CPT

## 2023-11-27 PROCEDURE — 81001 URINALYSIS AUTO W/SCOPE: CPT

## 2023-11-27 PROCEDURE — 99284 EMERGENCY DEPT VISIT MOD MDM: CPT

## 2023-11-27 PROCEDURE — 83690 ASSAY OF LIPASE: CPT

## 2023-11-27 PROCEDURE — 85025 COMPLETE CBC W/AUTO DIFF WBC: CPT

## 2023-11-27 RX ORDER — HYDROCODONE BITARTRATE AND ACETAMINOPHEN 5; 325 MG/1; MG/1
1 TABLET ORAL EVERY 6 HOURS PRN
Qty: 10 TABLET | Refills: 0 | Status: SHIPPED | OUTPATIENT
Start: 2023-11-27

## 2023-11-27 RX ORDER — SODIUM CHLORIDE 0.9 % (FLUSH) 0.9 %
10 SYRINGE (ML) INJECTION AS NEEDED
Status: DISCONTINUED | OUTPATIENT
Start: 2023-11-27 | End: 2023-11-27 | Stop reason: HOSPADM

## 2023-11-27 NOTE — ED NOTES
Discharge instructions reviewed and medications discussed with verbalized understanding from patient. Patient had no further questions or concerns.         Dirk Arreola RN  11/26/23 1181

## 2023-11-27 NOTE — ED PROVIDER NOTES
[address] ENCOUNTER        Pt Name: Johny Casas  MRN: [id number]  [address] [facility] [place] 1935  Date of evaluation: 11/26/2023  Provider: Disha Erickson DO  PCP: Power Vu MD  Note Started: 7:02 PM EST 11/26/23    CHIEF COMPLAINT      Dysuria, Low Back Pain    HISTORY OF PRESENT ILLNESS: 1 or more Elements     Chief Complaint   Patient presents with    Back Pain    Urinary Frequency    Dysuria     History from : Patient  Limitations to history : None    Johny Casas is a 80 y.o. male who presents to the emergency department secondary to concern for dysuria and low back pain. Reports that today he has been having burning sensations when he urinates. Reports this has happened a couple times during the last couple months. Reports that his doctor told him that he had a urinary tract infection and was treated with antibiotics. He localizes his back pain to the left paraspinal lumbar and does not radiate. Denies any bowel/bladder incontinence. Denies any perianal numbness or pain rating down his legs. Denies any injuries or falls. He is still able to effectively ambulate. Past medical history noted below. Aside from what is stated above denies any other symptoms or modifying factors. reports that he has never smoked. He has never used smokeless tobacco. He reports that he does not drink alcohol and does not use drugs. Nursing Notes were all reviewed and agreed with or any disagreements addressed in HPI/MDM. REVIEW OF SYSTEMS :    Review of Systems   Constitutional:  Negative for chills and fever. HENT:  Negative for congestion and rhinorrhea. Eyes:  Negative for pain and redness. Respiratory:  Negative for cough and shortness of breath. Cardiovascular:  Negative for chest pain and leg swelling. Gastrointestinal:  Negative for abdominal pain, constipation, diarrhea, nausea and vomiting. Genitourinary:  Positive for dysuria.  Negative for frequency

## 2023-11-27 NOTE — ED PROVIDER NOTES
Subjective   History of Present Illness  88-year-old male presents to the ED with a chief complaint of hematuria.  Patient notes that he has had essentially persistent hematuria for about a month or longer.  He has been seen and evaluated multiple times and told that he had a urinary tract infection.  He states that he is having some low back discomfort as well some difficulty with urination.  He denies fever or chills.  No nausea vomiting diarrhea or abdominal pain.  No other complaints at this time.      Review of Systems   Genitourinary:  Positive for difficulty urinating and hematuria. Negative for flank pain.   Musculoskeletal:  Positive for back pain.   All other systems reviewed and are negative.      Past Medical History:   Diagnosis Date    Elevated PSA     Hydronephrosis     Hypertension     Hypothyroid     Kidney failure     Urinary retention        No Known Allergies    Past Surgical History:   Procedure Laterality Date    ARTERIOVENOUS FISTULA      COLONOSCOPY      COLONOSCOPY N/A 6/4/2020    Procedure: COLONOSCOPY;  Surgeon: Irene Art MD;  Location: [facility] END[facility]COPY;  Service: Gastroenterology;  Laterality: N/A;    CYSTOSCOPY TRANSURETHRAL RESECTION OF PROSTATE      ENDOSCOPY      ENDOSCOPY N/A 6/4/2020    Procedure: ESOPHAGOGASTRODUODENOSCOPY WITH BIOPSY;  Surgeon: Irene Art MD;  Location: [facility] ENDOSCOPY;  Service: Gastroenterology;  Laterality: N/A;       No family history on file.    Social History     Socioeconomic History    Marital status:    Tobacco Use    Smoking status: Never    Smokeless tobacco: Never   Substance and Sexual Activity    Alcohol use: No    Drug use: No    Sexual activity: Defer           Objective   Physical Exam  Vitals and nursing note reviewed.   Constitutional:       General: He is not in acute distress.     Appearance: He is well-developed. He is not diaphoretic.   HENT:      Head: Normocephalic and atraumatic.      Nose: Nose normal.   Eyes:       Conjunctiva/sclera: Conjunctivae normal.      Pupils: Pupils are equal, round, and reactive to light.   Cardiovascular:      Rate and Rhythm: Normal rate and regular rhythm.      Pulses: Normal pulses.   Pulmonary:      Effort: Pulmonary effort is normal. No respiratory distress.      Breath sounds: Normal breath sounds.   Abdominal:      General: There is no distension.      Palpations: Abdomen is soft.      Tenderness: There is no abdominal tenderness.   Musculoskeletal:         General: No deformity.   Neurological:      Mental Status: He is alert and oriented to person, place, and time.      Cranial Nerves: No cranial nerve deficit.      Coordination: Coordination normal.         Procedures           ED Course                                           Medical Decision Making  Problems Addressed:  Low back pain, unspecified back pain laterality, unspecified chronicity, unspecified whether sciatica present: complicated acute illness or injury    Amount and/or Complexity of Data Reviewed  Radiology: ordered.    Risk  Prescription drug management.    Chief complaint: Low back pain, hematuria    Differential diagnosis includes but not limited to: Bladder mass, ureteral stone, musculoskeletal low back pain, prostatitis, other    Patient arrives stable, afebrile, without respiratory distress with initial vitals interpreted by myself.  Pertinent initial vitals include mild hypertension otherwise within normal limits    Plan: Urinalysis, CBC, CMP, CT abdomen pelvis, other    Results from initial plan were reviewed and interpreted by myself and include: CMP shows known chronic renal insufficiency.  CBC shows normal hemoglobin of 12.1 normal white blood cell count of 6.6 and hematocrit of 38.  Normal platelets.  Lipase nonspecific at 87.  CT abdomen pelvis shows bilateral nonobstructing renal calculi.  Urinalysis is negative for blood or infection.    Consultations N/A    Reevaluation resting comfortably.  Requesting to be  discharged.    Discussion: 88-year-old male presents to the ED with chief complaint of intermittent hematuria.  He is currently on antibiotics for presumed UTI by primary care physician.  He is still having some low back pain and discomfort.  His CT was negative for acute process.  His labs are reassuring.  There was no infection or hematuria on urinalysis.  Given his low back pain I do suspect that this could be likely musculoskeletal in nature likely arthritic given patient's age and he does note some component of chronic back pain.  There is no obvious acute urgent or emergent process identified today he is requesting to be discharged.  We will follow-up outpatient as needed.    Diagnostic information from other sources includes: Review of previous visits, prior labs, prior imaging, available notes from prior evaluations or visits with specialists, medication list, allergies, past medical history, past surgical history    Interventions in the ED included: Pain management    Disposition plan: Discharge    Final diagnoses:   Low back pain, unspecified back pain laterality, unspecified chronicity, unspecified whether sciatica present       ED Disposition  ED Disposition       ED Disposition   Discharge    Condition   Stable    Comment   --               Ari Ricardo MD  [address] [place] [address] [place], [place] [id number] [phone]               Medication List        New Prescriptions      HYDROcodone-acetaminophen 5-325 MG per tablet  Commonly known as: NORCO  Take 1 tablet by mouth Every 6 (Six) Hours As Needed for Severe Pain.               Where to Get Your Medications        These medications were sent to [place], [place] - [address] [place] [address] - [phone]  - [phone] [address] [address]      Phone: [phone]   HYDROcodone-acetaminophen 5-325 MG per tablet            Keny Gibson, DO  11/30/23 0116

## 2023-11-27 NOTE — TELEPHONE ENCOUNTER
Caller: JUAN CARLOS     Relationship to patient: SELF    Best call back number: [phone]    Patient is needing: PT WAS SEEN AT ER LAST NIGHT AND WAS TOLD TO F/U WITH YOU ASAP.  PAIN IN LOWER BACK PAIN /RIGHT SIDE AND BLOOD IN URINE.PT  PREVIOUSLY UNDER WENT DIALYSIS 25 YEARTS AGO

## 2023-11-28 ENCOUNTER — TELEPHONE (OUTPATIENT)
Dept: UROLOGY | Facility: CLINIC | Age: 88
End: 2023-11-28
Payer: MEDICARE

## 2023-11-28 NOTE — TELEPHONE ENCOUNTER
Name: MARGARET MAIN     Relationship: SIGNIFICANT OTHER    Best Callback Number: [phone]     HUB PROVIDED THE RELAY MESSAGE FROM THE OFFICE   PATIENT VOICED UNDERSTANDING AND HAS NO FURTHER QUESTIONS AT THIS TIME    ADDITIONAL INFORMATION:

## 2023-11-28 NOTE — TELEPHONE ENCOUNTER
Let pt know I got him scheduled with lydia and to give us a call back if that date and time did not work for him    Ally,CMA

## 2023-11-30 ENCOUNTER — HOSPITAL ENCOUNTER (OUTPATIENT)
Facility: HOSPITAL | Age: 88
Discharge: HOME | End: 2023-11-30
Payer: COMMERCIAL

## 2023-11-30 PROCEDURE — 87086 URINE CULTURE/COLONY COUNT: CPT

## 2023-12-02 LAB — BACTERIA UR CULT: NORMAL

## 2024-03-15 ENCOUNTER — HOSPITAL ENCOUNTER (OUTPATIENT)
Facility: HOSPITAL | Age: 89
Discharge: HOME | End: 2024-03-15
Payer: COMMERCIAL

## 2024-03-15 ENCOUNTER — HOSPITAL ENCOUNTER (OUTPATIENT)
Dept: GENERAL RADIOLOGY | Facility: HOSPITAL | Age: 89
Discharge: HOME | End: 2024-03-15
Payer: COMMERCIAL

## 2024-03-15 DIAGNOSIS — M25.551 RIGHT HIP PAIN: ICD-10-CM

## 2024-03-15 DIAGNOSIS — M54.40 LOW BACK PAIN WITH SCIATICA, SCIATICA LATERALITY UNSPECIFIED, UNSPECIFIED BACK PAIN LATERALITY, UNSPECIFIED CHRONICITY: ICD-10-CM

## 2024-03-15 DIAGNOSIS — M25.552 LEFT HIP PAIN: ICD-10-CM

## 2024-03-15 PROCEDURE — 73502 X-RAY EXAM HIP UNI 2-3 VIEWS: CPT

## 2024-03-15 PROCEDURE — 72100 X-RAY EXAM L-S SPINE 2/3 VWS: CPT

## 2024-03-21 ENCOUNTER — HOSPITAL ENCOUNTER (OUTPATIENT)
Facility: HOSPITAL | Age: 89
Discharge: HOME | End: 2024-03-21

## (undated) DEVICE — VLV SXN AIR/H2O ORCAPOD3 1P/U STRL

## (undated) DEVICE — LUBE JELLY PK/2.75GM STRL BX/144

## (undated) DEVICE — SYR LUER SLPTP 50ML

## (undated) DEVICE — FRCP BIOP RADLJAW4 HOT 2.2X240 BX40

## (undated) DEVICE — TP SXN YANKR BULB STRL

## (undated) DEVICE — GLV SURG SENSICARE W/ALOE PF LF 7.5 STRL

## (undated) DEVICE — GLV SURG TRIUMPH MICRO PF LTX 7.5 STRL

## (undated) DEVICE — Device

## (undated) DEVICE — PAD GRND REM POLYHESIVE A/ DISP

## (undated) DEVICE — MEDI-VAC NON-CONDUCTIVE SUCTION TUBING: Brand: CARDINAL HEALTH

## (undated) DEVICE — ENDOSCOPY PORT CONNECTOR FOR OLYMPUS® SCOPES: Brand: ERBE

## (undated) DEVICE — CONMED SCOPE SAVER BITE BLOCK, 20X27 MM: Brand: SCOPE SAVER

## (undated) DEVICE — HYBRID TUBING/CAP SET FOR OLYMPUS® SCOPES: Brand: ERBE